# Patient Record
Sex: FEMALE | Race: OTHER | Employment: UNEMPLOYED | URBAN - METROPOLITAN AREA
[De-identification: names, ages, dates, MRNs, and addresses within clinical notes are randomized per-mention and may not be internally consistent; named-entity substitution may affect disease eponyms.]

---

## 2020-08-22 ENCOUNTER — OFFICE VISIT (OUTPATIENT)
Dept: INTERNAL MEDICINE CLINIC | Facility: CLINIC | Age: 25
End: 2020-08-22
Payer: COMMERCIAL

## 2020-08-22 ENCOUNTER — LAB ENCOUNTER (OUTPATIENT)
Dept: LAB | Age: 25
End: 2020-08-22
Attending: PHYSICIAN ASSISTANT
Payer: COMMERCIAL

## 2020-08-22 VITALS
OXYGEN SATURATION: 99 % | HEART RATE: 66 BPM | HEIGHT: 65 IN | SYSTOLIC BLOOD PRESSURE: 100 MMHG | BODY MASS INDEX: 27.82 KG/M2 | RESPIRATION RATE: 16 BRPM | DIASTOLIC BLOOD PRESSURE: 62 MMHG | WEIGHT: 167 LBS | TEMPERATURE: 99 F

## 2020-08-22 DIAGNOSIS — Z11.3 ROUTINE SCREENING FOR STI (SEXUALLY TRANSMITTED INFECTION): ICD-10-CM

## 2020-08-22 DIAGNOSIS — Z01.89 ENCOUNTER FOR ROUTINE LABORATORY TESTING: ICD-10-CM

## 2020-08-22 DIAGNOSIS — N89.8 VAGINAL DISCHARGE: ICD-10-CM

## 2020-08-22 DIAGNOSIS — D50.9 IRON DEFICIENCY ANEMIA, UNSPECIFIED IRON DEFICIENCY ANEMIA TYPE: ICD-10-CM

## 2020-08-22 DIAGNOSIS — Z30.011 ENCOUNTER FOR ORAL CONTRACEPTION INITIAL PRESCRIPTION: ICD-10-CM

## 2020-08-22 DIAGNOSIS — R53.82 CHRONIC FATIGUE: ICD-10-CM

## 2020-08-22 DIAGNOSIS — Z00.00 ROUTINE PHYSICAL EXAMINATION: Primary | ICD-10-CM

## 2020-08-22 DIAGNOSIS — Z12.4 SCREENING FOR MALIGNANT NEOPLASM OF CERVIX: ICD-10-CM

## 2020-08-22 LAB
ALBUMIN SERPL-MCNC: 3.8 G/DL (ref 3.4–5)
ALBUMIN/GLOB SERPL: 1.1 {RATIO} (ref 1–2)
ALP LIVER SERPL-CCNC: 60 U/L (ref 52–144)
ALT SERPL-CCNC: 23 U/L (ref 13–56)
ANION GAP SERPL CALC-SCNC: 4 MMOL/L (ref 0–18)
AST SERPL-CCNC: 16 U/L (ref 15–37)
BASOPHILS # BLD AUTO: 0.04 X10(3) UL (ref 0–0.2)
BASOPHILS NFR BLD AUTO: 0.6 %
BILIRUB SERPL-MCNC: 0.5 MG/DL (ref 0.1–2)
BILIRUB UR QL STRIP.AUTO: NEGATIVE
BUN BLD-MCNC: 11 MG/DL (ref 7–18)
BUN/CREAT SERPL: 14.7 (ref 10–20)
CALCIUM BLD-MCNC: 9 MG/DL (ref 8.5–10.1)
CHLORIDE SERPL-SCNC: 109 MMOL/L (ref 98–112)
CHOLEST SMN-MCNC: 208 MG/DL (ref ?–200)
CO2 SERPL-SCNC: 26 MMOL/L (ref 21–32)
CREAT BLD-MCNC: 0.75 MG/DL (ref 0.55–1.02)
DEPRECATED HBV CORE AB SER IA-ACNC: 19 NG/ML (ref 12–114)
DEPRECATED RDW RBC AUTO: 39.5 FL (ref 35.1–46.3)
EOSINOPHIL # BLD AUTO: 0.07 X10(3) UL (ref 0–0.7)
EOSINOPHIL NFR BLD AUTO: 1 %
ERYTHROCYTE [DISTWIDTH] IN BLOOD BY AUTOMATED COUNT: 11.9 % (ref 11–15)
EST. AVERAGE GLUCOSE BLD GHB EST-MCNC: 100 MG/DL (ref 68–126)
GLOBULIN PLAS-MCNC: 3.5 G/DL (ref 2.8–4.4)
GLUCOSE BLD-MCNC: 76 MG/DL (ref 70–99)
GLUCOSE UR STRIP.AUTO-MCNC: NEGATIVE MG/DL
HBA1C MFR BLD HPLC: 5.1 % (ref ?–5.7)
HCT VFR BLD AUTO: 40.7 % (ref 35–48)
HDLC SERPL-MCNC: 51 MG/DL (ref 40–59)
HGB BLD-MCNC: 13.1 G/DL (ref 12–16)
IMM GRANULOCYTES # BLD AUTO: 0.01 X10(3) UL (ref 0–1)
IMM GRANULOCYTES NFR BLD: 0.1 %
IRON SATURATION: 12 % (ref 15–50)
IRON SERPL-MCNC: 50 UG/DL (ref 50–170)
KETONES UR STRIP.AUTO-MCNC: NEGATIVE MG/DL
LDLC SERPL CALC-MCNC: 133 MG/DL (ref ?–100)
LEUKOCYTE ESTERASE UR QL STRIP.AUTO: NEGATIVE
LYMPHOCYTES # BLD AUTO: 2.42 X10(3) UL (ref 1–4)
LYMPHOCYTES NFR BLD AUTO: 34.9 %
M PROTEIN MFR SERPL ELPH: 7.3 G/DL (ref 6.4–8.2)
MCH RBC QN AUTO: 29.2 PG (ref 26–34)
MCHC RBC AUTO-ENTMCNC: 32.2 G/DL (ref 31–37)
MCV RBC AUTO: 90.6 FL (ref 80–100)
MONOCYTES # BLD AUTO: 0.43 X10(3) UL (ref 0.1–1)
MONOCYTES NFR BLD AUTO: 6.2 %
NEUTROPHILS # BLD AUTO: 3.97 X10 (3) UL (ref 1.5–7.7)
NEUTROPHILS # BLD AUTO: 3.97 X10(3) UL (ref 1.5–7.7)
NEUTROPHILS NFR BLD AUTO: 57.2 %
NITRITE UR QL STRIP.AUTO: NEGATIVE
NONHDLC SERPL-MCNC: 157 MG/DL (ref ?–130)
OSMOLALITY SERPL CALC.SUM OF ELEC: 286 MOSM/KG (ref 275–295)
PATIENT FASTING Y/N/NP: YES
PATIENT FASTING Y/N/NP: YES
PH UR STRIP.AUTO: 5 [PH] (ref 4.5–8)
PLATELET # BLD AUTO: 294 10(3)UL (ref 150–450)
POTASSIUM SERPL-SCNC: 4.1 MMOL/L (ref 3.5–5.1)
PROT UR STRIP.AUTO-MCNC: NEGATIVE MG/DL
RBC # BLD AUTO: 4.49 X10(6)UL (ref 3.8–5.3)
RBC UR QL AUTO: NEGATIVE
SODIUM SERPL-SCNC: 139 MMOL/L (ref 136–145)
SP GR UR STRIP.AUTO: 1.03 (ref 1–1.03)
TOTAL IRON BINDING CAPACITY: 428 UG/DL (ref 240–450)
TRANSFERRIN SERPL-MCNC: 287 MG/DL (ref 200–360)
TRIGL SERPL-MCNC: 121 MG/DL (ref 30–149)
TSI SER-ACNC: 1.32 MIU/ML (ref 0.36–3.74)
UROBILINOGEN UR STRIP.AUTO-MCNC: <2 MG/DL
VIT D+METAB SERPL-MCNC: 19 NG/ML (ref 30–100)
VLDLC SERPL CALC-MCNC: 24 MG/DL (ref 0–30)
WBC # BLD AUTO: 6.9 X10(3) UL (ref 4–11)

## 2020-08-22 PROCEDURE — 80061 LIPID PANEL: CPT

## 2020-08-22 PROCEDURE — 3074F SYST BP LT 130 MM HG: CPT | Performed by: PHYSICIAN ASSISTANT

## 2020-08-22 PROCEDURE — 83036 HEMOGLOBIN GLYCOSYLATED A1C: CPT

## 2020-08-22 PROCEDURE — 87660 TRICHOMONAS VAGIN DIR PROBE: CPT | Performed by: PHYSICIAN ASSISTANT

## 2020-08-22 PROCEDURE — 82728 ASSAY OF FERRITIN: CPT

## 2020-08-22 PROCEDURE — 85025 COMPLETE CBC W/AUTO DIFF WBC: CPT

## 2020-08-22 PROCEDURE — 82306 VITAMIN D 25 HYDROXY: CPT

## 2020-08-22 PROCEDURE — 87591 N.GONORRHOEAE DNA AMP PROB: CPT | Performed by: PHYSICIAN ASSISTANT

## 2020-08-22 PROCEDURE — 80053 COMPREHEN METABOLIC PANEL: CPT

## 2020-08-22 PROCEDURE — 3078F DIAST BP <80 MM HG: CPT | Performed by: PHYSICIAN ASSISTANT

## 2020-08-22 PROCEDURE — 99385 PREV VISIT NEW AGE 18-39: CPT | Performed by: PHYSICIAN ASSISTANT

## 2020-08-22 PROCEDURE — 87480 CANDIDA DNA DIR PROBE: CPT | Performed by: PHYSICIAN ASSISTANT

## 2020-08-22 PROCEDURE — 87510 GARDNER VAG DNA DIR PROBE: CPT | Performed by: PHYSICIAN ASSISTANT

## 2020-08-22 PROCEDURE — 81003 URINALYSIS AUTO W/O SCOPE: CPT

## 2020-08-22 PROCEDURE — 84443 ASSAY THYROID STIM HORMONE: CPT

## 2020-08-22 PROCEDURE — 83540 ASSAY OF IRON: CPT

## 2020-08-22 PROCEDURE — 3008F BODY MASS INDEX DOCD: CPT | Performed by: PHYSICIAN ASSISTANT

## 2020-08-22 PROCEDURE — 88175 CYTOPATH C/V AUTO FLUID REDO: CPT | Performed by: PHYSICIAN ASSISTANT

## 2020-08-22 PROCEDURE — 87491 CHLMYD TRACH DNA AMP PROBE: CPT | Performed by: PHYSICIAN ASSISTANT

## 2020-08-22 PROCEDURE — 83550 IRON BINDING TEST: CPT

## 2020-08-22 PROCEDURE — 36415 COLL VENOUS BLD VENIPUNCTURE: CPT

## 2020-08-22 RX ORDER — NORGESTIMATE AND ETHINYL ESTRADIOL 7DAYSX3 LO
1 KIT ORAL DAILY
Qty: 3 PACKAGE | Refills: 3 | Status: SHIPPED | OUTPATIENT
Start: 2020-08-22 | End: 2021-05-05

## 2020-08-22 NOTE — PROGRESS NOTES
Wellness Exam    CC: Patient is presenting for a wellness exam    HPI:   Concerns: here to establish care. Hx JERAMIE, pt states from dietary deficiency. C/o fatigue recently.  Has tolerated oral iron supplement in the past but has also required iron infusion Neurological: Negative for tremors, weakness and numbness. Hematological: Negative for adenopathy. Does not bruise/bleed easily. Psychiatric/Behavioral: Negative for confusion and agitation. The patient is not nervous/anxious.       Pulse 66   Temp 98 immunizations were discussed with the patient and ordered as follows: Pt will obtain vaccine hx for us  Start ortho tricyclen lo. Discussed medication indications, risks, alternatives and side effects.   Check labs including iron studies and D d/t

## 2020-08-22 NOTE — PATIENT INSTRUCTIONS
Please send us your vaccine record  Have blood drawn today  Start pill on the first day of your next period  Food sources of iron  Iron is found in a few types of foods.  Good sources include:  · Red meat, poultry, fish, eggs  · Dried fruits (especially godfrey

## 2020-08-24 ENCOUNTER — TELEPHONE (OUTPATIENT)
Dept: INTERNAL MEDICINE CLINIC | Facility: CLINIC | Age: 25
End: 2020-08-24

## 2020-08-24 DIAGNOSIS — E55.9 VITAMIN D DEFICIENCY: Primary | ICD-10-CM

## 2020-08-24 LAB
C TRACH DNA SPEC QL NAA+PROBE: NEGATIVE
N GONORRHOEA DNA SPEC QL NAA+PROBE: NEGATIVE

## 2020-08-24 RX ORDER — ERGOCALCIFEROL 1.25 MG/1
50000 CAPSULE ORAL WEEKLY
Qty: 12 CAPSULE | Refills: 0 | Status: SHIPPED | OUTPATIENT
Start: 2020-08-24 | End: 2021-05-05

## 2020-08-24 NOTE — TELEPHONE ENCOUNTER
Spoke with patient's boyfriend (OK Per HIPPA) and discussed results and  recommendations, pharmacy confirmed, and understanding was expressed. Orders Placed.

## 2020-08-24 NOTE — TELEPHONE ENCOUNTER
----- Message from Tiffanie Escoto PA-C sent at 8/24/2020  3:18 PM CDT -----  Vitamin D low- replace with 50,000 International Units weekly x 12 wks then recheck   Otherwise no concerns regarding blood count, no anemia and normal iron stores; normal kid

## 2020-08-26 LAB — LAST PAP RESULT: NORMAL

## 2021-04-20 ENCOUNTER — TELEPHONE (OUTPATIENT)
Dept: OBGYN CLINIC | Facility: CLINIC | Age: 26
End: 2021-04-20

## 2021-04-20 NOTE — TELEPHONE ENCOUNTER
Patient calling to transfer OB care  GA 8 w 6 d  KHOA 11/24/21  Insurance Efrainr  Good time to return phone call Anytime    Pt states she had ultrasound done at [place] in [address] [facility] Appointments   Date Time Provider Department C

## 2021-04-22 ENCOUNTER — TELEPHONE (OUTPATIENT)
Dept: OBGYN CLINIC | Facility: CLINIC | Age: 26
End: 2021-04-22

## 2021-04-22 NOTE — TELEPHONE ENCOUNTER
Patient calling to initiate prenatal care  LMP 2/17/21  Patient is 7-8 weeks on 4/14/21  Confirmation Ultrasound and Appointment scheduled on    Insurance AM BETTER  Good time to return phone call ANYTIME    [address]

## 2021-04-22 NOTE — TELEPHONE ENCOUNTER
Patient calling to initiate prenatal care  LMP 2/16/2021   Patient is 7-8 weeks on 4/13/21  Confirmation Ultrasound and Appointment scheduled on   Future Appointments   Date Time Provider Lily Restrepo   5/5/2021  1:00 PM [facility] OB PFLD US [facility] OB/GYN P EM

## 2021-04-23 NOTE — TELEPHONE ENCOUNTER
Called patient with [facility] ID #[id number]. Left message on answering machine to call back.

## 2021-04-23 NOTE — TELEPHONE ENCOUNTER
Pt called back yesterday and appt scheduled    Future Appointments   Date Time Provider Lily Kaye   5/5/2021  1:00 PM EM[facility] OB PFLD US [facility] OB/GYN P [facility] [address]   5/5/2021  1:45 PM Imam, Alcide Halsted, MD [facility] OB/GYN P EM[facility] [address]

## 2021-04-27 NOTE — TELEPHONE ENCOUNTER
Called patient with  ID #[id number]. New patient;   Taking PNV; no other medications. Pt feeling well; no concerns. Pt advised to keep appt and call for spotting, bleeding, pain or any other concerns. .  Patient verbalized understanding.

## 2021-05-05 ENCOUNTER — TELEPHONE (OUTPATIENT)
Dept: OBGYN CLINIC | Facility: CLINIC | Age: 26
End: 2021-05-05

## 2021-05-05 ENCOUNTER — OFFICE VISIT (OUTPATIENT)
Dept: OBGYN CLINIC | Facility: CLINIC | Age: 26
End: 2021-05-05
Payer: COMMERCIAL

## 2021-05-05 ENCOUNTER — ULTRASOUND ENCOUNTER (OUTPATIENT)
Dept: OBGYN CLINIC | Facility: CLINIC | Age: 26
End: 2021-05-05
Payer: COMMERCIAL

## 2021-05-05 VITALS
SYSTOLIC BLOOD PRESSURE: 120 MMHG | HEIGHT: 64.75 IN | BODY MASS INDEX: 27.83 KG/M2 | DIASTOLIC BLOOD PRESSURE: 80 MMHG | WEIGHT: 165 LBS

## 2021-05-05 DIAGNOSIS — Z32.01 PREGNANCY EXAMINATION OR TEST, POSITIVE RESULT: ICD-10-CM

## 2021-05-05 DIAGNOSIS — N91.1 SECONDARY AMENORRHEA: Primary | ICD-10-CM

## 2021-05-05 PROCEDURE — 3008F BODY MASS INDEX DOCD: CPT | Performed by: OBSTETRICS & GYNECOLOGY

## 2021-05-05 PROCEDURE — 3079F DIAST BP 80-89 MM HG: CPT | Performed by: OBSTETRICS & GYNECOLOGY

## 2021-05-05 PROCEDURE — 99203 OFFICE O/P NEW LOW 30 MIN: CPT | Performed by: OBSTETRICS & GYNECOLOGY

## 2021-05-05 PROCEDURE — 3074F SYST BP LT 130 MM HG: CPT | Performed by: OBSTETRICS & GYNECOLOGY

## 2021-05-05 PROCEDURE — 76856 US EXAM PELVIC COMPLETE: CPT | Performed by: OBSTETRICS & GYNECOLOGY

## 2021-05-05 RX ORDER — PRENATAL VIT/IRON FUM/FOLIC AC 27MG-0.8MG
1 TABLET ORAL DAILY
COMMUNITY

## 2021-05-05 NOTE — TELEPHONE ENCOUNTER
Patient was seen in office today and was to call us back if she would like genetic testing. She has decided that she would like to go ahead and have the testing done.  Please call her when for is complete

## 2021-05-05 NOTE — PROGRESS NOTES
[facility]  Obstetrics and Gynecology    Subjective:     Belem Ramirez is a 27year old  female presents with c/o secondary amenorrhea and positive pregnancy test. The patient was recommended to return for further evaluation and a 05/05/21, 2:40 PM

## 2021-05-10 NOTE — TELEPHONE ENCOUNTER
Pt would like [id number] form to  at her next appt in [facility]. Routed to [facility].

## 2021-05-25 ENCOUNTER — INITIAL PRENATAL (OUTPATIENT)
Dept: OBGYN CLINIC | Facility: CLINIC | Age: 26
End: 2021-05-25
Payer: COMMERCIAL

## 2021-05-25 VITALS
DIASTOLIC BLOOD PRESSURE: 80 MMHG | BODY MASS INDEX: 27.83 KG/M2 | SYSTOLIC BLOOD PRESSURE: 136 MMHG | WEIGHT: 165 LBS | HEIGHT: 64.75 IN

## 2021-05-25 DIAGNOSIS — E55.9 VITAMIN D DEFICIENCY: ICD-10-CM

## 2021-05-25 DIAGNOSIS — Z34.90 PRENATAL CARE, ANTEPARTUM: Primary | ICD-10-CM

## 2021-05-25 DIAGNOSIS — Z11.3 SCREEN FOR STD (SEXUALLY TRANSMITTED DISEASE): ICD-10-CM

## 2021-05-25 DIAGNOSIS — Z36.8A ENCOUNTER FOR ANTENATAL SCREENING FOR OTHER GENETIC DEFECT: ICD-10-CM

## 2021-05-25 PROCEDURE — 3075F SYST BP GE 130 - 139MM HG: CPT | Performed by: NURSE PRACTITIONER

## 2021-05-25 PROCEDURE — 87086 URINE CULTURE/COLONY COUNT: CPT | Performed by: NURSE PRACTITIONER

## 2021-05-25 PROCEDURE — 3008F BODY MASS INDEX DOCD: CPT | Performed by: NURSE PRACTITIONER

## 2021-05-25 PROCEDURE — 87591 N.GONORRHOEAE DNA AMP PROB: CPT | Performed by: NURSE PRACTITIONER

## 2021-05-25 PROCEDURE — 81002 URINALYSIS NONAUTO W/O SCOPE: CPT | Performed by: NURSE PRACTITIONER

## 2021-05-25 PROCEDURE — 87491 CHLMYD TRACH DNA AMP PROBE: CPT | Performed by: NURSE PRACTITIONER

## 2021-05-25 PROCEDURE — 3079F DIAST BP 80-89 MM HG: CPT | Performed by: NURSE PRACTITIONER

## 2021-05-25 NOTE — PROGRESS NOTES
Here for initial prenatal visit with our group. 27year old  at 14w0d by LMP. She is accompanied by her spouse today who is translating for most of the visit. She does understand some English as well.     Patient's last menstrual period was 20

## 2021-06-04 ENCOUNTER — LAB ENCOUNTER (OUTPATIENT)
Dept: LAB | Age: 26
End: 2021-06-04
Attending: NURSE PRACTITIONER
Payer: COMMERCIAL

## 2021-06-04 DIAGNOSIS — E55.9 VITAMIN D DEFICIENCY: ICD-10-CM

## 2021-06-04 DIAGNOSIS — Z36.8A ENCOUNTER FOR ANTENATAL SCREENING FOR OTHER GENETIC DEFECT: Primary | ICD-10-CM

## 2021-06-04 DIAGNOSIS — Z34.90 PRENATAL CARE, ANTEPARTUM: ICD-10-CM

## 2021-06-04 PROCEDURE — 87389 HIV-1 AG W/HIV-1&-2 AB AG IA: CPT | Performed by: NURSE PRACTITIONER

## 2021-06-04 PROCEDURE — 82306 VITAMIN D 25 HYDROXY: CPT | Performed by: NURSE PRACTITIONER

## 2021-06-04 PROCEDURE — 85025 COMPLETE CBC W/AUTO DIFF WBC: CPT | Performed by: NURSE PRACTITIONER

## 2021-06-04 PROCEDURE — 87340 HEPATITIS B SURFACE AG IA: CPT | Performed by: NURSE PRACTITIONER

## 2021-06-04 PROCEDURE — 86762 RUBELLA ANTIBODY: CPT | Performed by: NURSE PRACTITIONER

## 2021-06-04 PROCEDURE — 86780 TREPONEMA PALLIDUM: CPT | Performed by: NURSE PRACTITIONER

## 2021-06-04 PROCEDURE — 86850 RBC ANTIBODY SCREEN: CPT | Performed by: NURSE PRACTITIONER

## 2021-06-04 PROCEDURE — 86900 BLOOD TYPING SEROLOGIC ABO: CPT | Performed by: NURSE PRACTITIONER

## 2021-06-04 PROCEDURE — 86901 BLOOD TYPING SEROLOGIC RH(D): CPT | Performed by: NURSE PRACTITIONER

## 2021-06-07 ENCOUNTER — TELEPHONE (OUTPATIENT)
Dept: INTERNAL MEDICINE CLINIC | Facility: CLINIC | Age: 26
End: 2021-06-07

## 2021-06-07 DIAGNOSIS — E55.9 VITAMIN D DEFICIENCY: Primary | ICD-10-CM

## 2021-06-07 RX ORDER — ERGOCALCIFEROL 1.25 MG/1
50000 CAPSULE ORAL WEEKLY
Qty: 12 CAPSULE | Refills: 0 | Status: SHIPPED | OUTPATIENT
Start: 2021-06-07 | End: 2021-10-05

## 2021-06-07 NOTE — TELEPHONE ENCOUNTER
LMOVM to [address] with patient's Spouse (OK Per HIPAA) and discussed results and  recommendations, pharmacy confirmed, and understanding was expressed. Orders placed.

## 2021-06-07 NOTE — TELEPHONE ENCOUNTER
----- Message from Lor Townsend MD sent at 6/5/2021  1:35 PM CDT -----  Replace vit d 50,000 IU weekly x 3 months, then recheck vit d

## 2021-06-09 ENCOUNTER — TELEPHONE (OUTPATIENT)
Dept: OBGYN CLINIC | Facility: CLINIC | Age: 26
End: 2021-06-09

## 2021-06-09 NOTE — TELEPHONE ENCOUNTER
Patient notified of normal results and female fetus. Pt requesting copy of report to  in [facility] tomorrow. Faxed to [facility].

## 2021-06-21 ENCOUNTER — ROUTINE PRENATAL (OUTPATIENT)
Dept: OBGYN CLINIC | Facility: CLINIC | Age: 26
End: 2021-06-21
Payer: COMMERCIAL

## 2021-06-21 VITALS
HEIGHT: 64.75 IN | DIASTOLIC BLOOD PRESSURE: 60 MMHG | SYSTOLIC BLOOD PRESSURE: 110 MMHG | BODY MASS INDEX: 28.44 KG/M2 | WEIGHT: 168.63 LBS

## 2021-06-21 DIAGNOSIS — Z34.02 ENCOUNTER FOR SUPERVISION OF NORMAL FIRST PREGNANCY IN SECOND TRIMESTER: Primary | ICD-10-CM

## 2021-06-21 PROCEDURE — 81002 URINALYSIS NONAUTO W/O SCOPE: CPT | Performed by: OBSTETRICS & GYNECOLOGY

## 2021-06-21 PROCEDURE — 3078F DIAST BP <80 MM HG: CPT | Performed by: OBSTETRICS & GYNECOLOGY

## 2021-06-21 PROCEDURE — 3008F BODY MASS INDEX DOCD: CPT | Performed by: OBSTETRICS & GYNECOLOGY

## 2021-06-21 PROCEDURE — 3074F SYST BP LT 130 MM HG: CPT | Performed by: OBSTETRICS & GYNECOLOGY

## 2021-06-21 NOTE — PROGRESS NOTES
Patient presents with:  Prenatal Care: LUCY  Translation through partner  Routine prenatal visit without complaints. Patient denies any bleeding, leaking fluid, cramping, or contractions. Good fetal movement. Assessment/Plan:  17w6d doing well  Had MT21.

## 2021-06-25 ENCOUNTER — TELEPHONE (OUTPATIENT)
Dept: OBGYN CLINIC | Facility: CLINIC | Age: 26
End: 2021-06-25

## 2021-06-25 NOTE — TELEPHONE ENCOUNTER
Contacted patient with  ID # [id number]    G1/P 0 GA 18 3/7 wks patient complaining of mild cramping that started yesterday. Feels like menstrual cramps. No severe pain.  Isn't able to say how much water she is drinking but says she is drinking all t

## 2021-07-09 ENCOUNTER — ROUTINE PRENATAL (OUTPATIENT)
Dept: OBGYN CLINIC | Facility: CLINIC | Age: 26
End: 2021-07-09
Payer: COMMERCIAL

## 2021-07-09 ENCOUNTER — ULTRASOUND ENCOUNTER (OUTPATIENT)
Dept: OBGYN CLINIC | Facility: CLINIC | Age: 26
End: 2021-07-09
Payer: COMMERCIAL

## 2021-07-09 VITALS — BODY MASS INDEX: 29 KG/M2 | WEIGHT: 174.19 LBS | DIASTOLIC BLOOD PRESSURE: 62 MMHG | SYSTOLIC BLOOD PRESSURE: 102 MMHG

## 2021-07-09 DIAGNOSIS — Z34.02 ENCOUNTER FOR SUPERVISION OF NORMAL FIRST PREGNANCY IN SECOND TRIMESTER: Primary | ICD-10-CM

## 2021-07-09 LAB
GLUCOSE (URINE DIPSTICK): NEGATIVE MG/DL
MULTISTIX LOT#: NORMAL NUMERIC

## 2021-07-09 PROCEDURE — 3074F SYST BP LT 130 MM HG: CPT | Performed by: OBSTETRICS & GYNECOLOGY

## 2021-07-09 PROCEDURE — 76805 OB US >/= 14 WKS SNGL FETUS: CPT | Performed by: OBSTETRICS & GYNECOLOGY

## 2021-07-09 PROCEDURE — 3078F DIAST BP <80 MM HG: CPT | Performed by: OBSTETRICS & GYNECOLOGY

## 2021-07-09 PROCEDURE — 81002 URINALYSIS NONAUTO W/O SCOPE: CPT | Performed by: OBSTETRICS & GYNECOLOGY

## 2021-07-09 NOTE — PROGRESS NOTES
20w3d seen for routine OB visit. Denies ctx, lof, vb. Reports good FM.     Patient Active Problem List:     Encounter for supervision of normal first pregnancy in second trimester       Gen: AAOx3, NAD  Resp: breathing comfortably  Abdomen: gravid, soft,

## 2021-07-09 NOTE — PATIENT INSTRUCTIONS
Pregnancy: Your Second Trimester Changes  Each day, you and your baby are changing and growing together. Here’s a quick look at what’s happening to both of you.   How you are changing  Even when you don’t notice it, your body is adapting to meet the needs

## 2021-08-04 ENCOUNTER — ROUTINE PRENATAL (OUTPATIENT)
Dept: OBGYN CLINIC | Facility: CLINIC | Age: 26
End: 2021-08-04
Payer: COMMERCIAL

## 2021-08-04 VITALS — WEIGHT: 178.63 LBS | DIASTOLIC BLOOD PRESSURE: 70 MMHG | BODY MASS INDEX: 30 KG/M2 | SYSTOLIC BLOOD PRESSURE: 102 MMHG

## 2021-08-04 DIAGNOSIS — Z34.02 ENCOUNTER FOR SUPERVISION OF NORMAL FIRST PREGNANCY IN SECOND TRIMESTER: Primary | ICD-10-CM

## 2021-08-04 PROCEDURE — 87086 URINE CULTURE/COLONY COUNT: CPT | Performed by: NURSE PRACTITIONER

## 2021-08-04 PROCEDURE — 3074F SYST BP LT 130 MM HG: CPT | Performed by: NURSE PRACTITIONER

## 2021-08-04 PROCEDURE — 3078F DIAST BP <80 MM HG: CPT | Performed by: NURSE PRACTITIONER

## 2021-08-04 NOTE — PROGRESS NOTES
LUCY  Doing well- some sharp left lower quadrant pain at times. Advised on maternity belt. She is to call if it is persistent or worsens in intensity, or any nausea/ vomiting or fever associated.    FM is good  RH positive  1 hr glucose/ CBC ordered  TDAP re

## 2021-08-09 ENCOUNTER — TELEPHONE (OUTPATIENT)
Dept: OBGYN CLINIC | Facility: CLINIC | Age: 26
End: 2021-08-09

## 2021-08-09 ENCOUNTER — ROUTINE PRENATAL (OUTPATIENT)
Dept: OBGYN CLINIC | Facility: CLINIC | Age: 26
End: 2021-08-09
Payer: COMMERCIAL

## 2021-08-09 VITALS
DIASTOLIC BLOOD PRESSURE: 62 MMHG | BODY MASS INDEX: 30 KG/M2 | HEART RATE: 89 BPM | SYSTOLIC BLOOD PRESSURE: 108 MMHG | WEIGHT: 179.19 LBS

## 2021-08-09 DIAGNOSIS — O36.8190 DECREASED FETAL MOVEMENT AFFECTING MANAGEMENT OF PREGNANCY, ANTEPARTUM, SINGLE OR UNSPECIFIED FETUS: Primary | ICD-10-CM

## 2021-08-09 DIAGNOSIS — Z34.90 PRENATAL CARE, ANTEPARTUM: ICD-10-CM

## 2021-08-09 LAB
GLUCOSE (URINE DIPSTICK): NEGATIVE MG/DL
MULTISTIX LOT#: ABNORMAL NUMERIC
PROTEIN (URINE DIPSTICK): 30 MG/DL

## 2021-08-09 PROCEDURE — 3078F DIAST BP <80 MM HG: CPT | Performed by: OBSTETRICS & GYNECOLOGY

## 2021-08-09 PROCEDURE — 81002 URINALYSIS NONAUTO W/O SCOPE: CPT | Performed by: OBSTETRICS & GYNECOLOGY

## 2021-08-09 PROCEDURE — 3074F SYST BP LT 130 MM HG: CPT | Performed by: OBSTETRICS & GYNECOLOGY

## 2021-08-09 NOTE — TELEPHONE ENCOUNTER
24W6D called c/o decrease in fetal movement. She states she has been having movement daily. She denies any vaginal bleeding or pain. Denies any other s/s. She has tried to eat and drink. She has not felt movement since last night.   Last OV:

## 2021-08-09 NOTE — PROGRESS NOTES
24w6d seen for decreased fetal movement. She has not noticed baby move in 2 days. Denies ctx, lof, vb.     Patient Active Problem List:     Encounter for supervision of normal first pregnancy in second trimester       Gen: AAOx3, NAD  Resp: breathing comfo

## 2021-08-10 ENCOUNTER — HOSPITAL ENCOUNTER (OUTPATIENT)
Age: 26
Discharge: HOME | End: 2021-08-10
Payer: COMMERCIAL

## 2021-08-10 ENCOUNTER — LAB ENCOUNTER (OUTPATIENT)
Dept: LAB | Age: 26
End: 2021-08-10
Attending: NURSE PRACTITIONER
Payer: COMMERCIAL

## 2021-08-10 ENCOUNTER — MED REC SCAN ONLY (OUTPATIENT)
Dept: OBGYN CLINIC | Facility: CLINIC | Age: 26
End: 2021-08-10

## 2021-08-10 VITALS
OXYGEN SATURATION: 99 % | SYSTOLIC BLOOD PRESSURE: 116 MMHG | DIASTOLIC BLOOD PRESSURE: 80 MMHG | TEMPERATURE: 97 F | RESPIRATION RATE: 20 BRPM | HEART RATE: 79 BPM

## 2021-08-10 DIAGNOSIS — R09.81 HEAD CONGESTION: Primary | ICD-10-CM

## 2021-08-10 DIAGNOSIS — Z34.02 ENCOUNTER FOR SUPERVISION OF NORMAL FIRST PREGNANCY IN SECOND TRIMESTER: ICD-10-CM

## 2021-08-10 DIAGNOSIS — Z20.822 LAB TEST NEGATIVE FOR COVID-19 VIRUS: ICD-10-CM

## 2021-08-10 LAB
BASOPHILS # BLD AUTO: 0.05 X10(3) UL (ref 0–0.2)
BASOPHILS NFR BLD AUTO: 0.4 %
EOSINOPHIL # BLD AUTO: 0.18 X10(3) UL (ref 0–0.7)
EOSINOPHIL NFR BLD AUTO: 1.6 %
ERYTHROCYTE [DISTWIDTH] IN BLOOD BY AUTOMATED COUNT: 12.6 %
GLUCOSE 1H P GLC SERPL-MCNC: 54 MG/DL
HCT VFR BLD AUTO: 37 %
HGB BLD-MCNC: 12.5 G/DL
IMM GRANULOCYTES # BLD AUTO: 0.03 X10(3) UL (ref 0–1)
IMM GRANULOCYTES NFR BLD: 0.3 %
LYMPHOCYTES # BLD AUTO: 2.08 X10(3) UL (ref 1–4)
LYMPHOCYTES NFR BLD AUTO: 18.7 %
MCH RBC QN AUTO: 31.5 PG (ref 26–34)
MCHC RBC AUTO-ENTMCNC: 33.8 G/DL (ref 31–37)
MCV RBC AUTO: 93.2 FL
MONOCYTES # BLD AUTO: 0.74 X10(3) UL (ref 0.1–1)
MONOCYTES NFR BLD AUTO: 6.6 %
NEUTROPHILS # BLD AUTO: 8.06 X10 (3) UL (ref 1.5–7.7)
NEUTROPHILS # BLD AUTO: 8.06 X10(3) UL (ref 1.5–7.7)
NEUTROPHILS NFR BLD AUTO: 72.4 %
PLATELET # BLD AUTO: 267 10(3)UL (ref 150–450)
RBC # BLD AUTO: 3.97 X10(6)UL
SARS-COV-2 RNA RESP QL NAA+PROBE: NOT DETECTED
WBC # BLD AUTO: 11.1 X10(3) UL (ref 4–11)

## 2021-08-10 PROCEDURE — 82950 GLUCOSE TEST: CPT | Performed by: NURSE PRACTITIONER

## 2021-08-10 PROCEDURE — 99213 OFFICE O/P EST LOW 20 MIN: CPT | Performed by: PHYSICIAN ASSISTANT

## 2021-08-10 PROCEDURE — 85025 COMPLETE CBC W/AUTO DIFF WBC: CPT | Performed by: NURSE PRACTITIONER

## 2021-08-10 PROCEDURE — U0002 COVID-19 LAB TEST NON-CDC: HCPCS | Performed by: PHYSICIAN ASSISTANT

## 2021-09-01 ENCOUNTER — ROUTINE PRENATAL (OUTPATIENT)
Dept: OBGYN CLINIC | Facility: CLINIC | Age: 26
End: 2021-09-01
Payer: COMMERCIAL

## 2021-09-01 ENCOUNTER — LAB ENCOUNTER (OUTPATIENT)
Dept: LAB | Age: 26
End: 2021-09-01
Attending: OBSTETRICS & GYNECOLOGY
Payer: COMMERCIAL

## 2021-09-01 VITALS
HEIGHT: 64.75 IN | SYSTOLIC BLOOD PRESSURE: 110 MMHG | WEIGHT: 182.19 LBS | BODY MASS INDEX: 30.73 KG/M2 | DIASTOLIC BLOOD PRESSURE: 60 MMHG

## 2021-09-01 DIAGNOSIS — Z23 NEED FOR VACCINATION: ICD-10-CM

## 2021-09-01 DIAGNOSIS — Z34.00 SUPERVISION OF NORMAL FIRST PREGNANCY, ANTEPARTUM: ICD-10-CM

## 2021-09-01 DIAGNOSIS — Z3A.28 28 WEEKS GESTATION OF PREGNANCY: Primary | ICD-10-CM

## 2021-09-01 LAB
GLUCOSE (URINE DIPSTICK): NEGATIVE MG/DL
MULTISTIX LOT#: NORMAL NUMERIC
PROTEIN (URINE DIPSTICK): NEGATIVE MG/DL

## 2021-09-01 PROCEDURE — 3074F SYST BP LT 130 MM HG: CPT | Performed by: OBSTETRICS & GYNECOLOGY

## 2021-09-01 PROCEDURE — 3078F DIAST BP <80 MM HG: CPT | Performed by: OBSTETRICS & GYNECOLOGY

## 2021-09-01 PROCEDURE — 81002 URINALYSIS NONAUTO W/O SCOPE: CPT | Performed by: OBSTETRICS & GYNECOLOGY

## 2021-09-01 PROCEDURE — 90715 TDAP VACCINE 7 YRS/> IM: CPT | Performed by: OBSTETRICS & GYNECOLOGY

## 2021-09-01 PROCEDURE — 3008F BODY MASS INDEX DOCD: CPT | Performed by: OBSTETRICS & GYNECOLOGY

## 2021-09-01 PROCEDURE — 90471 IMMUNIZATION ADMIN: CPT | Performed by: OBSTETRICS & GYNECOLOGY

## 2021-09-01 PROCEDURE — 87389 HIV-1 AG W/HIV-1&-2 AB AG IA: CPT | Performed by: OBSTETRICS & GYNECOLOGY

## 2021-09-01 NOTE — PROGRESS NOTES
LUCY    GA: 28w1d   21  1013   BP: 110/60   Weight: 182 lb 3.2 oz (82.6 kg)   Height: 64.75\"       Doing well, +FM  Denies LOF/VB/uctx. However, reports persistent clear discharge for past 3 days. SSE: negative pooling, negative ferning.  Nit

## 2021-09-08 ENCOUNTER — TELEPHONE (OUTPATIENT)
Dept: OBGYN CLINIC | Facility: CLINIC | Age: 26
End: 2021-09-08

## 2021-09-08 NOTE — TELEPHONE ENCOUNTER
Patient states that she was bleeding some on Sunday. This am she had more reddish bleeding. She does not have any cramping.  Please call to advise

## 2021-09-08 NOTE — TELEPHONE ENCOUNTER
29w1d; ; last seen 21. Called patient with [address] #[id number]. Pt reports vomiting blood on ; brown; one episode. Pt reports vomiting blood again this morning; dark red to black color; large amount mixed with water.   P

## 2021-09-17 ENCOUNTER — ROUTINE PRENATAL (OUTPATIENT)
Dept: OBGYN CLINIC | Facility: CLINIC | Age: 26
End: 2021-09-17
Payer: COMMERCIAL

## 2021-09-17 ENCOUNTER — LAB ENCOUNTER (OUTPATIENT)
Dept: LAB | Age: 26
End: 2021-09-17
Attending: NURSE PRACTITIONER
Payer: COMMERCIAL

## 2021-09-17 VITALS — BODY MASS INDEX: 31 KG/M2 | WEIGHT: 185.63 LBS | SYSTOLIC BLOOD PRESSURE: 114 MMHG | DIASTOLIC BLOOD PRESSURE: 64 MMHG

## 2021-09-17 DIAGNOSIS — E55.9 VITAMIN D DEFICIENCY: ICD-10-CM

## 2021-09-17 DIAGNOSIS — Z34.90 PRENATAL CARE, ANTEPARTUM: Primary | ICD-10-CM

## 2021-09-17 LAB
GLUCOSE (URINE DIPSTICK): NEGATIVE MG/DL
MULTISTIX LOT#: ABNORMAL NUMERIC
PROTEIN (URINE DIPSTICK): 30 MG/DL
VIT D+METAB SERPL-MCNC: 31 NG/ML (ref 30–100)

## 2021-09-17 PROCEDURE — 3074F SYST BP LT 130 MM HG: CPT | Performed by: NURSE PRACTITIONER

## 2021-09-17 PROCEDURE — 3078F DIAST BP <80 MM HG: CPT | Performed by: NURSE PRACTITIONER

## 2021-09-17 PROCEDURE — 81002 URINALYSIS NONAUTO W/O SCOPE: CPT | Performed by: NURSE PRACTITIONER

## 2021-09-17 PROCEDURE — 82306 VITAMIN D 25 HYDROXY: CPT | Performed by: NURSE PRACTITIONER

## 2021-09-17 NOTE — PATIENT INSTRUCTIONS
Conteo de patadas  Es normal que le preocupe la rivera de mata bebé. Para saber si el bebé está ml, alley de las cosas que puede hacer es anotar los movimientos del bebé alley vez al día.  A esto se le llama conteo de patadas.    Recuerde llevar teresa anotacione

## 2021-09-17 NOTE — PROGRESS NOTES
LUCY  Doing well, no concerns or questions  GOOD FM  Denies VB/LOF/uctx  Vitamin D ordered per patient request  RTC in 2 wks  Fetal movement instructions given

## 2021-09-20 ENCOUNTER — INITIAL PRENATAL (OUTPATIENT)
Dept: OBGYN CLINIC | Facility: CLINIC | Age: 26
End: 2021-09-20
Payer: COMMERCIAL

## 2021-09-20 VITALS
BODY MASS INDEX: 31.6 KG/M2 | HEART RATE: 87 BPM | DIASTOLIC BLOOD PRESSURE: 66 MMHG | SYSTOLIC BLOOD PRESSURE: 112 MMHG | HEIGHT: 64.75 IN | WEIGHT: 187.38 LBS

## 2021-09-20 DIAGNOSIS — Z34.83 PRENATAL CARE, SUBSEQUENT PREGNANCY, THIRD TRIMESTER: Primary | ICD-10-CM

## 2021-09-20 DIAGNOSIS — Z23 NEED FOR VACCINATION: ICD-10-CM

## 2021-09-20 PROCEDURE — 3074F SYST BP LT 130 MM HG: CPT | Performed by: STUDENT IN AN ORGANIZED HEALTH CARE EDUCATION/TRAINING PROGRAM

## 2021-09-20 PROCEDURE — 3008F BODY MASS INDEX DOCD: CPT | Performed by: STUDENT IN AN ORGANIZED HEALTH CARE EDUCATION/TRAINING PROGRAM

## 2021-09-20 PROCEDURE — 3078F DIAST BP <80 MM HG: CPT | Performed by: STUDENT IN AN ORGANIZED HEALTH CARE EDUCATION/TRAINING PROGRAM

## 2021-09-20 PROCEDURE — 81002 URINALYSIS NONAUTO W/O SCOPE: CPT | Performed by: STUDENT IN AN ORGANIZED HEALTH CARE EDUCATION/TRAINING PROGRAM

## 2021-09-20 PROCEDURE — 90471 IMMUNIZATION ADMIN: CPT | Performed by: STUDENT IN AN ORGANIZED HEALTH CARE EDUCATION/TRAINING PROGRAM

## 2021-09-20 PROCEDURE — 90686 IIV4 VACC NO PRSV 0.5 ML IM: CPT | Performed by: STUDENT IN AN ORGANIZED HEALTH CARE EDUCATION/TRAINING PROGRAM

## 2021-09-20 NOTE — PROGRESS NOTES
Transfer OB - 30w6d   Doing well overall, baby active  Uncomplicated pregnancy thusfar, uncomplicated PMHx.  Only taking PNV, stopped Vit D (& last level normal range)    27year old , EDC by LMP c/w 11wk US, transferred care at 30w6d  -last pap

## 2021-10-05 ENCOUNTER — TELEPHONE (OUTPATIENT)
Dept: OBGYN CLINIC | Facility: CLINIC | Age: 26
End: 2021-10-05

## 2021-10-05 ENCOUNTER — ROUTINE PRENATAL (OUTPATIENT)
Dept: OBGYN CLINIC | Facility: CLINIC | Age: 26
End: 2021-10-05
Payer: COMMERCIAL

## 2021-10-05 VITALS
HEART RATE: 89 BPM | SYSTOLIC BLOOD PRESSURE: 110 MMHG | HEIGHT: 64.75 IN | DIASTOLIC BLOOD PRESSURE: 62 MMHG | BODY MASS INDEX: 31.54 KG/M2 | WEIGHT: 187 LBS

## 2021-10-05 DIAGNOSIS — E66.3 OVERWEIGHT (BMI 25.0-29.9): ICD-10-CM

## 2021-10-05 DIAGNOSIS — O9A.213 TRAUMATIC INJURY DURING PREGNANCY IN THIRD TRIMESTER: Primary | ICD-10-CM

## 2021-10-05 DIAGNOSIS — Z34.03 PREGNANCY, FIRST, THIRD TRIMESTER: ICD-10-CM

## 2021-10-05 DIAGNOSIS — Z86.39 HISTORY OF VITAMIN D DEFICIENCY: ICD-10-CM

## 2021-10-05 PROCEDURE — 3008F BODY MASS INDEX DOCD: CPT | Performed by: OBSTETRICS & GYNECOLOGY

## 2021-10-05 PROCEDURE — 3078F DIAST BP <80 MM HG: CPT | Performed by: OBSTETRICS & GYNECOLOGY

## 2021-10-05 PROCEDURE — 3074F SYST BP LT 130 MM HG: CPT | Performed by: OBSTETRICS & GYNECOLOGY

## 2021-10-05 PROCEDURE — 59025 FETAL NON-STRESS TEST: CPT | Performed by: OBSTETRICS & GYNECOLOGY

## 2021-10-05 PROCEDURE — 81002 URINALYSIS NONAUTO W/O SCOPE: CPT | Performed by: OBSTETRICS & GYNECOLOGY
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
## 2021-10-05 NOTE — PATIENT INSTRUCTIONS
Weight gain goal 15-25 lb for the entire pregnancy. Limit further weight gain. Make sure you are taking extra vitamin D in addition to the prenatal vitamin.  Your history of deficiency indicates you generally do not have enough without extra supplemen

## 2021-10-21 ENCOUNTER — ROUTINE PRENATAL (OUTPATIENT)
Dept: OBGYN CLINIC | Facility: CLINIC | Age: 26
End: 2021-10-21
Payer: COMMERCIAL

## 2021-10-21 VITALS
DIASTOLIC BLOOD PRESSURE: 58 MMHG | BODY MASS INDEX: 32.04 KG/M2 | HEART RATE: 95 BPM | HEIGHT: 64.75 IN | SYSTOLIC BLOOD PRESSURE: 110 MMHG | WEIGHT: 190 LBS

## 2021-10-21 DIAGNOSIS — Z34.83 PRENATAL CARE, SUBSEQUENT PREGNANCY, THIRD TRIMESTER: Primary | ICD-10-CM

## 2021-10-21 DIAGNOSIS — Z3A.35 35 WEEKS GESTATION OF PREGNANCY: ICD-10-CM

## 2021-10-21 PROCEDURE — 3074F SYST BP LT 130 MM HG: CPT | Performed by: OBSTETRICS & GYNECOLOGY

## 2021-10-21 PROCEDURE — 3008F BODY MASS INDEX DOCD: CPT | Performed by: OBSTETRICS & GYNECOLOGY

## 2021-10-21 PROCEDURE — 3078F DIAST BP <80 MM HG: CPT | Performed by: OBSTETRICS & GYNECOLOGY

## 2021-10-21 PROCEDURE — 81002 URINALYSIS NONAUTO W/O SCOPE: CPT | Performed by: OBSTETRICS & GYNECOLOGY

## 2021-10-21 NOTE — PROGRESS NOTES
Transfer OB - 30w6d   Doing well overall, baby active  Uncomplicated pregnancy thusfar, uncomplicated PMHx.  Only taking PNV, stopped Vit D (& last level normal range)  Patient has no complaints  -transferred care at 30w6d  -last pap 8/2020 NILM  -NIPT norm

## 2021-10-26 ENCOUNTER — ROUTINE PRENATAL (OUTPATIENT)
Dept: OBGYN CLINIC | Facility: CLINIC | Age: 26
End: 2021-10-26
Payer: COMMERCIAL

## 2021-10-26 VITALS
BODY MASS INDEX: 32.04 KG/M2 | DIASTOLIC BLOOD PRESSURE: 70 MMHG | SYSTOLIC BLOOD PRESSURE: 120 MMHG | WEIGHT: 190 LBS | HEIGHT: 64.75 IN

## 2021-10-26 DIAGNOSIS — Z3A.36 36 WEEKS GESTATION OF PREGNANCY: ICD-10-CM

## 2021-10-26 DIAGNOSIS — Z34.03 ENCOUNTER FOR SUPERVISION OF NORMAL FIRST PREGNANCY IN THIRD TRIMESTER: Primary | ICD-10-CM

## 2021-10-26 PROCEDURE — 81002 URINALYSIS NONAUTO W/O SCOPE: CPT | Performed by: OBSTETRICS & GYNECOLOGY

## 2021-10-26 PROCEDURE — 3074F SYST BP LT 130 MM HG: CPT | Performed by: OBSTETRICS & GYNECOLOGY

## 2021-10-26 PROCEDURE — 87081 CULTURE SCREEN ONLY: CPT | Performed by: OBSTETRICS & GYNECOLOGY

## 2021-10-26 PROCEDURE — 3078F DIAST BP <80 MM HG: CPT | Performed by: OBSTETRICS & GYNECOLOGY

## 2021-10-26 PROCEDURE — 87653 STREP B DNA AMP PROBE: CPT | Performed by: OBSTETRICS & GYNECOLOGY

## 2021-10-26 PROCEDURE — 3008F BODY MASS INDEX DOCD: CPT | Performed by: OBSTETRICS & GYNECOLOGY

## 2021-10-26 NOTE — PROGRESS NOTES
Patient has no complaints  - GBS done  -transferred care at 30w6d  -last pap 8/2020 NILM  -NIPT normal, GIRL  -normal anatomy US  -1h GTT normal, Hgb 12.5, 3rd tri HIV neg  -Tdap, flu vaccine and HIV done

## 2021-11-02 ENCOUNTER — ROUTINE PRENATAL (OUTPATIENT)
Dept: OBGYN CLINIC | Facility: CLINIC | Age: 26
End: 2021-11-02
Payer: COMMERCIAL

## 2021-11-02 VITALS
DIASTOLIC BLOOD PRESSURE: 68 MMHG | WEIGHT: 191 LBS | HEART RATE: 94 BPM | HEIGHT: 64.75 IN | BODY MASS INDEX: 32.21 KG/M2 | SYSTOLIC BLOOD PRESSURE: 118 MMHG

## 2021-11-02 DIAGNOSIS — Z34.93 ENCOUNTER FOR SUPERVISION OF NORMAL PREGNANCY IN THIRD TRIMESTER, UNSPECIFIED GRAVIDITY: Primary | ICD-10-CM

## 2021-11-02 PROCEDURE — 3074F SYST BP LT 130 MM HG: CPT | Performed by: STUDENT IN AN ORGANIZED HEALTH CARE EDUCATION/TRAINING PROGRAM

## 2021-11-02 PROCEDURE — 3008F BODY MASS INDEX DOCD: CPT | Performed by: STUDENT IN AN ORGANIZED HEALTH CARE EDUCATION/TRAINING PROGRAM

## 2021-11-02 PROCEDURE — 3078F DIAST BP <80 MM HG: CPT | Performed by: STUDENT IN AN ORGANIZED HEALTH CARE EDUCATION/TRAINING PROGRAM

## 2021-11-02 PROCEDURE — 81002 URINALYSIS NONAUTO W/O SCOPE: CPT | Performed by: STUDENT IN AN ORGANIZED HEALTH CARE EDUCATION/TRAINING PROGRAM

## 2021-11-02 NOTE — PROGRESS NOTES
Doing well overall. Felt more discharge coming out a few times Sunday.  Was white but wondering if abnormal  Spec exam negative for ROM, cervix closed    -transferred care at 30w6d  -breast pump ordered, looking for peds but in process of changing insurance

## 2021-11-08 ENCOUNTER — TELEPHONE (OUTPATIENT)
Dept: OBGYN CLINIC | Facility: CLINIC | Age: 26
End: 2021-11-08

## 2021-11-09 ENCOUNTER — ROUTINE PRENATAL (OUTPATIENT)
Dept: OBGYN CLINIC | Facility: CLINIC | Age: 26
End: 2021-11-09
Payer: COMMERCIAL

## 2021-11-09 VITALS
HEART RATE: 102 BPM | WEIGHT: 190 LBS | HEIGHT: 64.75 IN | DIASTOLIC BLOOD PRESSURE: 64 MMHG | BODY MASS INDEX: 32.04 KG/M2 | SYSTOLIC BLOOD PRESSURE: 110 MMHG

## 2021-11-09 DIAGNOSIS — Z3A.38 38 WEEKS GESTATION OF PREGNANCY: ICD-10-CM

## 2021-11-09 DIAGNOSIS — Z34.93 ENCOUNTER FOR SUPERVISION OF NORMAL PREGNANCY IN THIRD TRIMESTER, UNSPECIFIED GRAVIDITY: Primary | ICD-10-CM

## 2021-11-09 DIAGNOSIS — Z01.812 PRE-PROCEDURAL LABORATORY EXAMINATION: ICD-10-CM

## 2021-11-09 PROCEDURE — 3078F DIAST BP <80 MM HG: CPT | Performed by: OBSTETRICS & GYNECOLOGY

## 2021-11-09 PROCEDURE — 81002 URINALYSIS NONAUTO W/O SCOPE: CPT | Performed by: OBSTETRICS & GYNECOLOGY

## 2021-11-09 PROCEDURE — 3074F SYST BP LT 130 MM HG: CPT | Performed by: OBSTETRICS & GYNECOLOGY

## 2021-11-09 PROCEDURE — 3008F BODY MASS INDEX DOCD: CPT | Performed by: OBSTETRICS & GYNECOLOGY

## 2021-11-09 NOTE — PROGRESS NOTES
Patient c/o unable to sleep at night desires IOL, schedule 11/17/21 cytotec 8:30 pm  - GBS done  -transferred care at 30w6d  -last pap 8/2020 NILM  -NIPT normal, GIRL  -normal anatomy US  -1h GTT normal, Hgb 12.5, 3rd tri HIV neg  -Tdap, flu vaccine and HI

## 2021-11-11 ENCOUNTER — TELEPHONE (OUTPATIENT)
Dept: OBGYN CLINIC | Facility: CLINIC | Age: 26
End: 2021-11-11

## 2021-11-16 ENCOUNTER — LAB ENCOUNTER (OUTPATIENT)
Dept: LAB | Age: 26
End: 2021-11-16
Attending: OBSTETRICS & GYNECOLOGY
Payer: COMMERCIAL

## 2021-11-16 ENCOUNTER — TELEPHONE (OUTPATIENT)
Dept: OBGYN UNIT | Facility: HOSPITAL | Age: 26
End: 2021-11-16

## 2021-11-16 ENCOUNTER — ROUTINE PRENATAL (OUTPATIENT)
Dept: OBGYN CLINIC | Facility: CLINIC | Age: 26
End: 2021-11-16
Payer: COMMERCIAL

## 2021-11-16 VITALS
WEIGHT: 193 LBS | BODY MASS INDEX: 32.55 KG/M2 | DIASTOLIC BLOOD PRESSURE: 64 MMHG | HEART RATE: 105 BPM | HEIGHT: 64.75 IN | SYSTOLIC BLOOD PRESSURE: 100 MMHG

## 2021-11-16 DIAGNOSIS — Z3A.39 39 WEEKS GESTATION OF PREGNANCY: Primary | ICD-10-CM

## 2021-11-16 DIAGNOSIS — Z01.812 PRE-PROCEDURAL LABORATORY EXAMINATION: ICD-10-CM

## 2021-11-16 PROCEDURE — 81002 URINALYSIS NONAUTO W/O SCOPE: CPT | Performed by: OBSTETRICS & GYNECOLOGY

## 2021-11-16 PROCEDURE — 3078F DIAST BP <80 MM HG: CPT | Performed by: OBSTETRICS & GYNECOLOGY

## 2021-11-16 PROCEDURE — 3074F SYST BP LT 130 MM HG: CPT | Performed by: OBSTETRICS & GYNECOLOGY

## 2021-11-16 PROCEDURE — 3008F BODY MASS INDEX DOCD: CPT | Performed by: OBSTETRICS & GYNECOLOGY

## 2021-11-16 NOTE — PROGRESS NOTES
11/16/21 0948   Call Complete   Call Complete Yes   Date Call Complete 11/16/21   Time Call Complete [phone]   Call Comments All covid precautions d/w patient. Covid test being performed today.

## 2021-11-18 ENCOUNTER — ANESTHESIA EVENT (OUTPATIENT)
Dept: OBGYN UNIT | Facility: HOSPITAL | Age: 26
End: 2021-11-18
Payer: COMMERCIAL

## 2021-11-18 ENCOUNTER — HOSPITAL ENCOUNTER (INPATIENT)
Facility: HOSPITAL | Age: 26
LOS: 2 days | Discharge: HOME | End: 2021-11-20
Attending: OBSTETRICS & GYNECOLOGY | Admitting: OBSTETRICS & GYNECOLOGY
Payer: COMMERCIAL

## 2021-11-18 ENCOUNTER — APPOINTMENT (OUTPATIENT)
Dept: OBGYN CLINIC | Facility: HOSPITAL | Age: 26
End: 2021-11-18
Payer: COMMERCIAL

## 2021-11-18 ENCOUNTER — ANESTHESIA (OUTPATIENT)
Dept: OBGYN UNIT | Facility: HOSPITAL | Age: 26
End: 2021-11-18
Payer: COMMERCIAL

## 2021-11-18 PROBLEM — Z34.90 PREGNANCY: Status: ACTIVE | Noted: 2021-11-18

## 2021-11-18 PROCEDURE — 3E0P7VZ INTRODUCTION OF HORMONE INTO FEMALE REPRODUCTIVE, VIA NATURAL OR ARTIFICIAL OPENING: ICD-10-PCS | Performed by: STUDENT IN AN ORGANIZED HEALTH CARE EDUCATION/TRAINING PROGRAM

## 2021-11-18 PROCEDURE — 59400 OBSTETRICAL CARE: CPT | Performed by: STUDENT IN AN ORGANIZED HEALTH CARE EDUCATION/TRAINING PROGRAM

## 2021-11-18 PROCEDURE — 0UQMXZZ REPAIR VULVA, EXTERNAL APPROACH: ICD-10-PCS | Performed by: STUDENT IN AN ORGANIZED HEALTH CARE EDUCATION/TRAINING PROGRAM

## 2021-11-18 RX ORDER — ONDANSETRON 2 MG/ML
4 INJECTION INTRAMUSCULAR; INTRAVENOUS EVERY 6 HOURS PRN
Status: DISCONTINUED | OUTPATIENT
Start: 2021-11-18 | End: 2021-11-18

## 2021-11-18 RX ORDER — HYDROMORPHONE HYDROCHLORIDE 1 MG/ML
1 INJECTION, SOLUTION INTRAMUSCULAR; INTRAVENOUS; SUBCUTANEOUS EVERY 2 HOUR PRN
Status: DISCONTINUED | OUTPATIENT
Start: 2021-11-18 | End: 2021-11-18

## 2021-11-18 RX ORDER — SODIUM CHLORIDE, SODIUM LACTATE, POTASSIUM CHLORIDE, CALCIUM CHLORIDE 600; 310; 30; 20 MG/100ML; MG/100ML; MG/100ML; MG/100ML
INJECTION, SOLUTION INTRAVENOUS CONTINUOUS
Status: DISCONTINUED | OUTPATIENT
Start: 2021-11-18 | End: 2021-11-18

## 2021-11-18 RX ORDER — SIMETHICONE 80 MG
80 TABLET,CHEWABLE ORAL 3 TIMES DAILY PRN
Status: DISCONTINUED | OUTPATIENT
Start: 2021-11-18 | End: 2021-11-20

## 2021-11-18 RX ORDER — ACETAMINOPHEN 500 MG
500 TABLET ORAL EVERY 6 HOURS PRN
Status: DISCONTINUED | OUTPATIENT
Start: 2021-11-18 | End: 2021-11-18

## 2021-11-18 RX ORDER — NALBUPHINE HCL 10 MG/ML
2.5 AMPUL (ML) INJECTION
Status: DISCONTINUED | OUTPATIENT
Start: 2021-11-18 | End: 2021-11-18

## 2021-11-18 RX ORDER — CHOLECALCIFEROL (VITAMIN D3) 25 MCG
1 TABLET,CHEWABLE ORAL DAILY
Status: DISCONTINUED | OUTPATIENT
Start: 2021-11-18 | End: 2021-11-20

## 2021-11-18 RX ORDER — AMMONIA INHALANTS 0.04 G/.3ML
0.3 INHALANT RESPIRATORY (INHALATION) AS NEEDED
Status: DISCONTINUED | OUTPATIENT
Start: 2021-11-18 | End: 2021-11-18

## 2021-11-18 RX ORDER — BUPIVACAINE HCL/0.9 % NACL/PF 0.25 %
5 PLASTIC BAG, INJECTION (ML) EPIDURAL AS NEEDED
Status: DISCONTINUED | OUTPATIENT
Start: 2021-11-18 | End: 2021-11-18

## 2021-11-18 RX ORDER — TRISODIUM CITRATE DIHYDRATE AND CITRIC ACID MONOHYDRATE 500; 334 MG/5ML; MG/5ML
30 SOLUTION ORAL AS NEEDED
Status: DISCONTINUED | OUTPATIENT
Start: 2021-11-18 | End: 2021-11-18

## 2021-11-18 RX ORDER — ACETAMINOPHEN 500 MG
1000 TABLET ORAL EVERY 6 HOURS PRN
Status: DISCONTINUED | OUTPATIENT
Start: 2021-11-18 | End: 2021-11-20

## 2021-11-18 RX ORDER — IBUPROFEN 600 MG/1
600 TABLET ORAL EVERY 6 HOURS PRN
Status: DISCONTINUED | OUTPATIENT
Start: 2021-11-18 | End: 2021-11-18

## 2021-11-18 RX ORDER — IBUPROFEN 600 MG/1
600 TABLET ORAL EVERY 6 HOURS
Status: DISCONTINUED | OUTPATIENT
Start: 2021-11-18 | End: 2021-11-20

## 2021-11-18 RX ORDER — TERBUTALINE SULFATE 1 MG/ML
0.25 INJECTION, SOLUTION SUBCUTANEOUS AS NEEDED
Status: DISCONTINUED | OUTPATIENT
Start: 2021-11-18 | End: 2021-11-18

## 2021-11-18 RX ORDER — DOCUSATE SODIUM 100 MG/1
100 CAPSULE, LIQUID FILLED ORAL
Status: DISCONTINUED | OUTPATIENT
Start: 2021-11-18 | End: 2021-11-20

## 2021-11-18 RX ORDER — DEXTROSE, SODIUM CHLORIDE, SODIUM LACTATE, POTASSIUM CHLORIDE, AND CALCIUM CHLORIDE 5; .6; .31; .03; .02 G/100ML; G/100ML; G/100ML; G/100ML; G/100ML
INJECTION, SOLUTION INTRAVENOUS AS NEEDED
Status: DISCONTINUED | OUTPATIENT
Start: 2021-11-18 | End: 2021-11-18

## 2021-11-18 RX ORDER — HYDROMORPHONE HYDROCHLORIDE 1 MG/ML
INJECTION, SOLUTION INTRAMUSCULAR; INTRAVENOUS; SUBCUTANEOUS
Status: COMPLETED
Start: 2021-11-18 | End: 2021-11-18

## 2021-11-18 RX ORDER — BISACODYL 10 MG
10 SUPPOSITORY, RECTAL RECTAL ONCE AS NEEDED
Status: DISCONTINUED | OUTPATIENT
Start: 2021-11-18 | End: 2021-11-20

## 2021-11-18 NOTE — ANESTHESIA PROCEDURE NOTES
Labor Analgesia  Performed by: Fco Carcamo MD  Authorized by: Fco Carcamo MD       General Information and Staff    Start Time:  11/18/2021 2:50 PM  End Time:  11/18/2021 3:04 PM  Anesthesiologist:  Fco Carcamo MD  Performed by:   Anesthesiol

## 2021-11-18 NOTE — H&P
[facility]  Obstetrics and Gynecology  History & Physical    Alexi Chowdhury Patient Status:  Inpatient    3/31/1995 MRN [id number]   Location [address] [facility] Attending Cassy Atwood. [place] 91 Day 0 PCP Lily Casas BMI.    General: NAD  FHT: Cat 1, reactive  TOCO: some irritability    SVE: 0.5 / thick / high per RN      Prenatal Labs Brief Review   Blood Type:   Lab Results   Component Value Date    ABO A 11/18/2021    RH Positive 11/18/2021     GBS:  Negative      I

## 2021-11-18 NOTE — ANESTHESIA PREPROCEDURE EVALUATION
PRE-OP EVALUATION    Patient Name: Rex Edward    Admit Diagnosis: preg state   Pregnancy    Pre-op Diagnosis: * No pre-op diagnosis entered *        Anesthesia Procedure: LABOR ANALGESIA    * No surgeons found in log *    Pre-op vitals review allergies. Anesthesia Evaluation    Patient summary reviewed.     Anesthetic Complications  (-) history of anesthetic complications         GI/Hepatic/Renal                                 Cardiovascular        Exercise tolerance: good     MET: >4

## 2021-11-19 NOTE — PROGRESS NOTES
PPD#1    Pt has no complaints, lochia min   11/19/21  0728   BP: 106/64   Pulse: 71   Resp: 16   Temp: 98 °F (36.7 °C)     Recent Labs   Lab 11/18/21  0814 11/19/21  0802   RBC 4.15 3.98   HGB 13.1 12.3   HCT 37.9 38.1   MCV 91.3 95.7   MCH 31.6 30.9   MCH

## 2021-11-19 NOTE — PAYOR COMM NOTE
--------------  ADMISSION REVIEW     Payor: Myrtle PARIKH  Subscriber #:  [id number]  Authorization Number: [id number]    Admit date: 11/18/21  Admit time:  6:45 AM       REVIEW DOCUMENTATION:  ED Provider Notes    No notes of this type exis History: History reviewed. No pertinent family history.   Social History: Social History    Tobacco Use      Smoking status: Former Smoker        Quit date: 10/5/2020        Years since quittin.1      Smokeless tobacco: Never Used    Alcohol use: Not Cu signed by Pam Keller MD on 11/18/2021  9:50 AM         MEDICATIONS ADMINISTERED IN LAST 1 DAY:  acetaminophen (TYLENOL EXTRA STRENGTH) tab 1,000 mg     Date Action Dose Route User    11/19/2021 0912 Given 1,000 mg Oral Ashwin TREJO RN      B RN    11/18/2021 1300 Rate/Dose Change 4 jerry-units/min Intravenous Alexi Sher RN      prenatal vitamin w/DHA (PNV with DHA) cap 1 capsule     Date Action Dose Route User    11/19/2021 8721 Given 1 capsule Oral Hilda Rudd RN      witch smith vaginal delivery     Details:      Delivery location: delivery room      Delivery Providers    Delivering Clinician: Traci Dial MD          Delivery personnel:  Provider Role   Caitie Woodward, RN Baby Nurse   Rosa Isela Xiao RN Delivery year old  at 39w2d admitted for elective induction of labor. Pregnancy uncomplicated. Received 1 dose cytotec for cervical ripening. Membranes ruptured spontaneously. Pitocin started, patient progressed quickly to complete.  Uncomplicated  of vig

## 2021-11-19 NOTE — PROGRESS NOTES
Patient up and about, in stable condition, will continue to assist as needed.  Will endorse to [facility]

## 2021-11-19 NOTE — CM/SW NOTE
met with patient to see if they had a PCP for infant. Couple has [employer]. Couple needed list of PCP for infant.  printed list of PCP for couple to select from. No other needs at this time.

## 2021-11-20 VITALS
DIASTOLIC BLOOD PRESSURE: 71 MMHG | WEIGHT: 193 LBS | SYSTOLIC BLOOD PRESSURE: 110 MMHG | HEART RATE: 70 BPM | TEMPERATURE: 98 F | BODY MASS INDEX: 32.55 KG/M2 | RESPIRATION RATE: 16 BRPM | HEIGHT: 64.76 IN

## 2021-11-20 NOTE — PROGRESS NOTES
10 month old Developmental Screening    Ages and Stages totals:     Communication total:  5   Gross Motor total:30   Fine Motor total: 40   Problem Solving total: 20   Personal-Social total:  50     Concerns? Does your child attend ? No  Who live with your child at the home?  parents  Where else does the child spend time? [address] mother  Does anyone smoke at home? Yes  Does your child ride in a car seat facing backwards  Yes  Do you have smoke detectors/ CO detectors? Yes  Do you have pets at home? yes - cat  Are there guns at home? Yes  Does your baby sleep alone in his/her crib or bassinet? Yes  Does your baby ever sleep with you? No  Are there any blankets, toys, bumper pads, or other objects in your baby's bed? Yes  Do you place your baby on his/her back for sleep all the time? Yes  How many ounces of formula does your baby take at a time? 6, Which formula? simalac sensitive  Or how many minutes does your baby spend at the breast?  n/a  If your baby is , do you give him/her Vit D drops? N/A  Does he/she drink juice? yes  Does your child drink from a cup? Yes  Does your child eat a variety of food? Yes  How many times a day do you brush your child's teeth:  2  Is your home child proofed (outlet covers in place, medications/ put away and looked, etc . . . ? )  Yes  Do you ever worry that your food will run out before you get money or food stamps to get more? No  Has anything bad, sad, or scary happened to you or your children since your last visit? No  What concerns would you like to discuss today?   No [facility]  Post-Partum Progress Note    [place] Patient Status:  Inpatient    3/31/1995 MRN [id number]   [facility] 2SW-J Attending Nelli Owens, [address] [facility] Day # 2 PCP Flores Mandujano MD     SUBJECTIVE:  Patient doi

## 2021-11-23 ENCOUNTER — TELEPHONE (OUTPATIENT)
Dept: OBGYN UNIT | Facility: HOSPITAL | Age: 26
End: 2021-11-23

## 2021-11-24 ENCOUNTER — TELEPHONE (OUTPATIENT)
Dept: OBGYN UNIT | Facility: HOSPITAL | Age: 26
End: 2021-11-24

## 2021-11-26 ENCOUNTER — TELEPHONE (OUTPATIENT)
Dept: OBGYN UNIT | Facility: HOSPITAL | Age: 26
End: 2021-11-26

## 2021-11-26 NOTE — PROGRESS NOTES
Reviewed self and infant care w / mom, she verbalizes understanding of instructions reviewed. Encourage to follow up w/ MDs as directed and w/ questions/concerns. Enc to join ct, lives w .

## 2021-12-23 ENCOUNTER — HOSPITAL ENCOUNTER (OUTPATIENT)
Age: 26
Discharge: HOME | End: 2021-12-23
Payer: COMMERCIAL

## 2021-12-23 VITALS
OXYGEN SATURATION: 96 % | HEART RATE: 93 BPM | RESPIRATION RATE: 16 BRPM | SYSTOLIC BLOOD PRESSURE: 98 MMHG | DIASTOLIC BLOOD PRESSURE: 70 MMHG | BODY MASS INDEX: 29 KG/M2 | WEIGHT: 171.38 LBS | TEMPERATURE: 98 F

## 2021-12-23 DIAGNOSIS — U07.1 COVID-19: ICD-10-CM

## 2021-12-23 DIAGNOSIS — Z20.822 ENCOUNTER FOR LABORATORY TESTING FOR COVID-19 VIRUS: Primary | ICD-10-CM

## 2021-12-23 PROCEDURE — 99213 OFFICE O/P EST LOW 20 MIN: CPT | Performed by: NURSE PRACTITIONER

## 2021-12-23 PROCEDURE — U0002 COVID-19 LAB TEST NON-CDC: HCPCS | Performed by: NURSE PRACTITIONER

## 2021-12-23 NOTE — ED PROVIDER NOTES
Patient Seen in: [facility]      History   Patient presents with:  Runny Nose  Fatigue  Body ache and/or chills    Stated Complaint: runny nose body aches    Subjective:   82-year-old female presents today with URI symptoms and fatigue with po Head: Normocephalic. Right Ear: Tympanic membrane and ear canal normal.      Left Ear: Tympanic membrane and ear canal normal.      Nose: Mucosal edema and congestion present. Mouth/Throat:      Pharynx: Uvula midline.  Posterior oropharyngeal candice [id number]  [phone]    In 2 weeks  As needed          Medications Prescribed:  Current Discharge Medication List

## 2021-12-23 NOTE — ED INITIAL ASSESSMENT (HPI)
Patient states her  tested + for Covid with a home test yesterday. C/O fatigue, body aches and runny nose today.  She had a negative home covid test today

## 2022-01-19 ENCOUNTER — TELEPHONE (OUTPATIENT)
Dept: OBGYN CLINIC | Facility: CLINIC | Age: 27
End: 2022-01-19

## 2022-01-19 NOTE — TELEPHONE ENCOUNTER
Pt came into the office to  a copy of her records. Pt states she had an insurance change and is transferring care. Please advise if needed. Records were printed and given to pt as she requested.  me

## 2022-02-02 NOTE — ANESTHESIA POSTPROCEDURE EVALUATION
[address] Patient Status:  Inpatient   Age/Gender 27year old female MRN [id number]   [facility] 2SW-J Attending No att. providers found   [facility] Day # 2 PCP Chioma Moore MD       Anesthesia Post-op Note

## 2022-02-02 NOTE — ANESTHESIA POSTPROCEDURE EVALUATION
[facility] Patient Status:  Inpatient   Age/Gender 27year old female MRN [id number]   [facility] 2SW-J Attending No att. providers found   [address] Day # 2 PCP Dav Zacarias MD       Anesthesia Post-op Note

## 2022-03-18 NOTE — L&D DELIVERY NOTE
Fareed Preston, Girl [[id number]]    Labor Events     labor?: No  Cervical ripening type: Misoprostol  Antibiotics received during labor?: No  Rupture date/time: 2021 1026     Rupture type: SROM  Fluid color: Clear  Induction: Misoprostol  In Refilled per protocol.    Minute:  5 Minute:  10 Minute:  15 Minute:  20 Minute:    Skin color: 1  1       Heart rate: 2  2       Reflex irritablity: 2  2       Muscle tone: 2  2       Respiratory effort: 2  2       Total: 9  9          Apgars assigned by: Ai Sullivan  [place] dispo

## 2023-02-23 ENCOUNTER — TELEPHONE (OUTPATIENT)
Dept: OBGYN CLINIC | Facility: CLINIC | Age: 28
End: 2023-02-23

## 2023-02-23 NOTE — TELEPHONE ENCOUNTER
Established patient. LMP: 1/13/23  GA: 5 6/7 wks    Contacted patient. She reports cramping that started a couple of days ago. More than just mild cramping and woke her up last night. Does not seem to improve with hydration. Denies bleeding. Persistent cramping and her abdomen is tender to the touch. Pain is 4-5/10 currently  No fever, but was having chills last night especially. Does have an appendix yet. Advised to proceed to ER for evaluation. Patient and her spouse agree with plan.

## 2023-02-23 NOTE — TELEPHONE ENCOUNTER
Patient calling to initiate prenatal care  LMP 1/13/23  Patient is 7-8 weeks on 3/10/23  Confirmation Ultrasound and Appointment scheduled on   Future Appointments   Date Time Provider Lily Restrepo   3/20/2023  2:45 PM EMG OB US PLFD [facility] OB/GYN P [facility] [address]   3/20/2023  4:30 PM Kelin Garza MD EM[facility] OB/GYN P [facility] [address]       Any history of ectopic pregnancy? no  Any history of miscarriage? no  Any medications that you are taking on a regular basis other than prenatal vitamins? prenatal (if not taking prenatal vitamins, encourage patient to start taking.)  Any bleeding since the first day of last LMP and your positive pregnancy test? no    Insurance bcbs hmo   patient having a lot of cramping and would like to talk to a nurse. She doesn't remember this from her last pregnancy.  Please advise

## 2023-04-05 ENCOUNTER — INITIAL PRENATAL (OUTPATIENT)
Dept: OBGYN CLINIC | Facility: CLINIC | Age: 28
End: 2023-04-05
Payer: COMMERCIAL

## 2023-04-05 VITALS
DIASTOLIC BLOOD PRESSURE: 66 MMHG | HEIGHT: 65 IN | BODY MASS INDEX: 26.16 KG/M2 | SYSTOLIC BLOOD PRESSURE: 104 MMHG | HEART RATE: 95 BPM | WEIGHT: 157 LBS

## 2023-04-05 DIAGNOSIS — Z36.9 ANTENATAL SCREENING ENCOUNTER: ICD-10-CM

## 2023-04-05 DIAGNOSIS — Z12.4 SCREENING FOR CERVICAL CANCER: ICD-10-CM

## 2023-04-05 DIAGNOSIS — Z11.3 SCREEN FOR STD (SEXUALLY TRANSMITTED DISEASE): ICD-10-CM

## 2023-04-05 DIAGNOSIS — Z34.90 PRENATAL CARE, ANTEPARTUM: Primary | ICD-10-CM

## 2023-04-05 PROBLEM — O9A.213 TRAUMATIC INJURY DURING PREGNANCY IN THIRD TRIMESTER: Status: RESOLVED | Noted: 2021-10-05 | Resolved: 2023-04-05

## 2023-04-05 PROBLEM — Z34.03 PREGNANCY, FIRST, THIRD TRIMESTER: Status: RESOLVED | Noted: 2021-07-09 | Resolved: 2023-04-05

## 2023-04-05 LAB
GLUCOSE (URINE DIPSTICK): NEGATIVE MG/DL
MULTISTIX LOT#: NORMAL NUMERIC

## 2023-04-05 PROCEDURE — 3074F SYST BP LT 130 MM HG: CPT | Performed by: NURSE PRACTITIONER

## 2023-04-05 PROCEDURE — 3008F BODY MASS INDEX DOCD: CPT | Performed by: NURSE PRACTITIONER

## 2023-04-05 PROCEDURE — 81002 URINALYSIS NONAUTO W/O SCOPE: CPT | Performed by: NURSE PRACTITIONER

## 2023-04-05 PROCEDURE — 3078F DIAST BP <80 MM HG: CPT | Performed by: NURSE PRACTITIONER

## 2023-04-05 RX ORDER — RIBOFLAVIN (VITAMIN B2) 100 MG
100 TABLET ORAL DAILY
COMMUNITY

## 2023-04-06 ENCOUNTER — TELEPHONE (OUTPATIENT)
Dept: OBGYN CLINIC | Facility: CLINIC | Age: 28
End: 2023-04-06

## 2023-04-06 DIAGNOSIS — Z36.8A ENCOUNTER FOR ANTENATAL SCREENING FOR OTHER GENETIC DEFECT: Primary | ICD-10-CM

## 2023-04-06 NOTE — TELEPHONE ENCOUNTER
Order placed via Epic. Faxed to number provided; fax confirmation received. No genetic hx. Per OV: Pt will call insurance for coverage. She is aware that she will need to call to have the order placed.

## 2023-04-08 LAB
CHLAMYDIA TRACHOMATIS$RNA, TMA: NOT DETECTED
NEISSERIA GONORRHOEAE$RNA, TMA: NOT DETECTED

## 2023-04-10 ENCOUNTER — TELEPHONE (OUTPATIENT)
Dept: OBGYN CLINIC | Facility: CLINIC | Age: 28
End: 2023-04-10

## 2023-04-10 NOTE — TELEPHONE ENCOUNTER
Pt states that lately she has been having red/ dry spots on her face. Wondering if related to pregnancy and what she can do?     Future Appointments   Date Time Provider Lily Restrepo   5/3/2023  2:00 PM JOSE Zaragoza EM[facility] OB/GYN P [facility] [address]

## 2023-04-10 NOTE — TELEPHONE ENCOUNTER
Patient is having spots on face, only on face. They do itch, not painful. They are there all the time, but the itching is worse in the morning when she wakes up. Patient also is having acne very bad. Patient states she had both of these things in previous pregnancy. [employer] message sent to patient with what she can take for the rash and a soap to use for the acne. Patient requested a [facility] message be sent to her with the information.

## 2023-04-11 ENCOUNTER — TELEPHONE (OUTPATIENT)
Dept: OBGYN CLINIC | Facility: CLINIC | Age: 28
End: 2023-04-11

## 2023-04-11 PROBLEM — Z34.80 SUPERVISION OF OTHER NORMAL PREGNANCY, ANTEPARTUM: Status: ACTIVE | Noted: 2023-04-11

## 2023-04-11 NOTE — TELEPHONE ENCOUNTER
Per routing message from Dr. Mohit Joshi results noted. [employer] message sent.   Without gender info Pt tolerated fluids well  Pt's port de-accessed and flushed, brisk blood return noted  Pt given AVS and aware of next appts

## 2023-04-12 NOTE — TELEPHONE ENCOUNTER
Pt called stating that her best friend Simarn Hickman (ph. [phone]) would be calling the office to obtain gender only results. She gives verbal permission to get gender.

## 2023-05-03 ENCOUNTER — ULTRASOUND ENCOUNTER (OUTPATIENT)
Dept: OBGYN CLINIC | Facility: CLINIC | Age: 28
End: 2023-05-03
Payer: COMMERCIAL

## 2023-05-03 ENCOUNTER — ROUTINE PRENATAL (OUTPATIENT)
Dept: OBGYN CLINIC | Facility: CLINIC | Age: 28
End: 2023-05-03
Payer: COMMERCIAL

## 2023-05-03 ENCOUNTER — TELEPHONE (OUTPATIENT)
Dept: OBGYN CLINIC | Facility: CLINIC | Age: 28
End: 2023-05-03

## 2023-05-03 VITALS
DIASTOLIC BLOOD PRESSURE: 70 MMHG | SYSTOLIC BLOOD PRESSURE: 110 MMHG | BODY MASS INDEX: 25.16 KG/M2 | HEIGHT: 65 IN | HEART RATE: 65 BPM | WEIGHT: 151 LBS

## 2023-05-03 DIAGNOSIS — Z32.01 PREGNANCY EXAMINATION OR TEST, POSITIVE RESULT: ICD-10-CM

## 2023-05-03 DIAGNOSIS — Z36.9 PRENATAL SCREENING ENCOUNTER: Primary | ICD-10-CM

## 2023-05-03 DIAGNOSIS — Z36.9 ENCOUNTER FOR ANTENATAL SCREENING OF MOTHER: Primary | ICD-10-CM

## 2023-05-03 DIAGNOSIS — N91.1 SECONDARY AMENORRHEA: Primary | ICD-10-CM

## 2023-05-03 DIAGNOSIS — Z34.80 SUPERVISION OF OTHER NORMAL PREGNANCY, ANTEPARTUM: ICD-10-CM

## 2023-05-03 PROCEDURE — 76815 OB US LIMITED FETUS(S): CPT | Performed by: OBSTETRICS & GYNECOLOGY

## 2023-05-03 PROCEDURE — 3078F DIAST BP <80 MM HG: CPT | Performed by: NURSE PRACTITIONER

## 2023-05-03 PROCEDURE — 3008F BODY MASS INDEX DOCD: CPT | Performed by: NURSE PRACTITIONER

## 2023-05-03 PROCEDURE — 3074F SYST BP LT 130 MM HG: CPT | Performed by: NURSE PRACTITIONER

## 2023-05-03 PROCEDURE — 81002 URINALYSIS NONAUTO W/O SCOPE: CPT | Performed by: NURSE PRACTITIONER

## 2023-05-03 NOTE — TELEPHONE ENCOUNTER
Please reorder labs under [facility].  Lab updated in system

## 2023-05-03 NOTE — PROGRESS NOTES
LUCY SWAN- none yet  Doing well, concerned about her due date because she wasn't able to make her first trimester Us appointment. She had a Mirena IUD removed 11/2022 and had 3 menses in December and her last in January. The last one was much shorter than her norm. Will obtain an US for dating but advised the margin of error is greater and not as accurate as it would have been early on. No complaints.  No LOF/VB/uctx  Genetic testing- she completed the MaterniT 21, she declines the AFP  Anatomy Scan 4+ weeks with LUCY

## 2023-05-09 ENCOUNTER — LAB ENCOUNTER (OUTPATIENT)
Dept: LAB | Age: 28
End: 2023-05-09
Attending: NURSE PRACTITIONER
Payer: COMMERCIAL

## 2023-05-09 DIAGNOSIS — Z36.9 ENCOUNTER FOR ANTENATAL SCREENING OF MOTHER: ICD-10-CM

## 2023-05-09 LAB
ANTIBODY SCREEN: NEGATIVE
BASOPHILS # BLD AUTO: 0.06 X10(3) UL (ref 0–0.2)
BASOPHILS NFR BLD AUTO: 0.6 %
EOSINOPHIL # BLD AUTO: 0.05 X10(3) UL (ref 0–0.7)
EOSINOPHIL NFR BLD AUTO: 0.5 %
ERYTHROCYTE [DISTWIDTH] IN BLOOD BY AUTOMATED COUNT: 13.1 %
HBV SURFACE AG SER-ACNC: <0.1 [IU]/L
HBV SURFACE AG SERPL QL IA: NONREACTIVE
HCT VFR BLD AUTO: 39.4 %
HCV AB SERPL QL IA: NONREACTIVE
HGB BLD-MCNC: 13.4 G/DL
IMM GRANULOCYTES # BLD AUTO: 0.02 X10(3) UL (ref 0–1)
IMM GRANULOCYTES NFR BLD: 0.2 %
LYMPHOCYTES # BLD AUTO: 2.1 X10(3) UL (ref 1–4)
LYMPHOCYTES NFR BLD AUTO: 22.4 %
MCH RBC QN AUTO: 30.2 PG (ref 26–34)
MCHC RBC AUTO-ENTMCNC: 34 G/DL (ref 31–37)
MCV RBC AUTO: 88.9 FL
MONOCYTES # BLD AUTO: 0.43 X10(3) UL (ref 0.1–1)
MONOCYTES NFR BLD AUTO: 4.6 %
NEUTROPHILS # BLD AUTO: 6.72 X10 (3) UL (ref 1.5–7.7)
NEUTROPHILS # BLD AUTO: 6.72 X10(3) UL (ref 1.5–7.7)
NEUTROPHILS NFR BLD AUTO: 71.7 %
PLATELET # BLD AUTO: 300 10(3)UL (ref 150–450)
RBC # BLD AUTO: 4.43 X10(6)UL
RH BLOOD TYPE: POSITIVE
RUBV IGG SER QL: POSITIVE
RUBV IGG SER-ACNC: 88.8 IU/ML (ref 10–?)
T PALLIDUM AB SER QL IA: NONREACTIVE
WBC # BLD AUTO: 9.4 X10(3) UL (ref 4–11)

## 2023-05-09 PROCEDURE — 86900 BLOOD TYPING SEROLOGIC ABO: CPT | Performed by: NURSE PRACTITIONER

## 2023-05-09 PROCEDURE — 85025 COMPLETE CBC W/AUTO DIFF WBC: CPT | Performed by: NURSE PRACTITIONER

## 2023-05-09 PROCEDURE — 86803 HEPATITIS C AB TEST: CPT | Performed by: NURSE PRACTITIONER

## 2023-05-09 PROCEDURE — 86762 RUBELLA ANTIBODY: CPT | Performed by: NURSE PRACTITIONER

## 2023-05-09 PROCEDURE — 86780 TREPONEMA PALLIDUM: CPT | Performed by: NURSE PRACTITIONER

## 2023-05-09 PROCEDURE — 86901 BLOOD TYPING SEROLOGIC RH(D): CPT | Performed by: NURSE PRACTITIONER

## 2023-05-09 PROCEDURE — 87340 HEPATITIS B SURFACE AG IA: CPT | Performed by: NURSE PRACTITIONER

## 2023-05-09 PROCEDURE — 86850 RBC ANTIBODY SCREEN: CPT | Performed by: NURSE PRACTITIONER

## 2023-05-09 PROCEDURE — 87389 HIV-1 AG W/HIV-1&-2 AB AG IA: CPT | Performed by: NURSE PRACTITIONER

## 2023-06-05 ENCOUNTER — ULTRASOUND ENCOUNTER (OUTPATIENT)
Dept: OBGYN CLINIC | Facility: CLINIC | Age: 28
End: 2023-06-05
Payer: COMMERCIAL

## 2023-06-05 ENCOUNTER — ROUTINE PRENATAL (OUTPATIENT)
Dept: OBGYN CLINIC | Facility: CLINIC | Age: 28
End: 2023-06-05
Payer: COMMERCIAL

## 2023-06-05 VITALS
BODY MASS INDEX: 27 KG/M2 | DIASTOLIC BLOOD PRESSURE: 64 MMHG | HEART RATE: 87 BPM | SYSTOLIC BLOOD PRESSURE: 114 MMHG | WEIGHT: 162.63 LBS

## 2023-06-05 DIAGNOSIS — Z34.90 PRENATAL CARE, ANTEPARTUM: ICD-10-CM

## 2023-06-05 DIAGNOSIS — Z34.80 SUPERVISION OF OTHER NORMAL PREGNANCY, ANTEPARTUM: Primary | ICD-10-CM

## 2023-06-05 NOTE — PROGRESS NOTES
LUCY    GA: 20w3d   23  1512   BP: 114/64   Pulse: 87   Weight: 162 lb 9.6 oz (73.8 kg)       Doing well  No complaints. Denies LOF/VB/uctx  RH  +  Genetic testing cffDNA neg. MSAFP declined. Anatomy Scan  unremarkable   CBC and 1 hr GTT order and instructions provided  Patient currently undergoing therapy patient process. Request a letter regarding COVID-19 vaccination. Discussed with patient recommendations for COVID-19 vaccination in pregnancy. However, patient prefers to decline vaccination until after delivery. Letter provided stating acog recommendation and patient's preference. Reports occasional vomiting. Reports feeling well after emesis. Discussed with patient possible acid reflux symptoms. Recommend Pepcid daily. Recommend Tums as needed. Precautions provided. Problem List Items Addressed This Visit        Gravid and     Supervision of other normal pregnancy, antepartum - Primary    Relevant Orders    US OB COMPLETE 2ND TRIMESTER >14 WKS EMG ONLY 37132 (Completed)    CBC (WITH DIFF, PLATELET) REFLEX TO FERRITIN    GLUCOSE 1HR OB   Other Visit Diagnoses     Prenatal care, antepartum        Relevant Orders    URINALYSIS NONAUTO W/O SCOPE (OB URISTIX) (Completed)            RTC in 4 wks         Note to patient and family   The [facility] makes medical notes available to patients in the interest of transparency. However, please be advised that this is a medical document. It is intended as mabu-en-jvik communication. It is written and medical language may contain abbreviations or verbiage that are technical and unfamiliar. It may appear blunt or direct. Medical documents are intended to carry relevant information, facts as evident, and the clinical opinion of the practitioner.

## 2023-07-19 ENCOUNTER — LAB ENCOUNTER (OUTPATIENT)
Dept: LAB | Age: 28
End: 2023-07-19
Attending: OBSTETRICS & GYNECOLOGY
Payer: COMMERCIAL

## 2023-07-19 DIAGNOSIS — Z34.80 SUPERVISION OF OTHER NORMAL PREGNANCY, ANTEPARTUM: ICD-10-CM

## 2023-07-19 LAB
BASOPHILS # BLD AUTO: 0.04 X10(3) UL (ref 0–0.2)
BASOPHILS NFR BLD AUTO: 0.4 %
EOSINOPHIL # BLD AUTO: 0.04 X10(3) UL (ref 0–0.7)
EOSINOPHIL NFR BLD AUTO: 0.4 %
ERYTHROCYTE [DISTWIDTH] IN BLOOD BY AUTOMATED COUNT: 12.7 %
GLUCOSE 1H P GLC SERPL-MCNC: 90 MG/DL
HCT VFR BLD AUTO: 38.3 %
HGB BLD-MCNC: 12.7 G/DL
IMM GRANULOCYTES # BLD AUTO: 0.05 X10(3) UL (ref 0–1)
IMM GRANULOCYTES NFR BLD: 0.5 %
LYMPHOCYTES # BLD AUTO: 2.01 X10(3) UL (ref 1–4)
LYMPHOCYTES NFR BLD AUTO: 18.2 %
MCH RBC QN AUTO: 31 PG (ref 26–34)
MCHC RBC AUTO-ENTMCNC: 33.2 G/DL (ref 31–37)
MCV RBC AUTO: 93.4 FL
MONOCYTES # BLD AUTO: 0.62 X10(3) UL (ref 0.1–1)
MONOCYTES NFR BLD AUTO: 5.6 %
NEUTROPHILS # BLD AUTO: 8.26 X10 (3) UL (ref 1.5–7.7)
NEUTROPHILS # BLD AUTO: 8.26 X10(3) UL (ref 1.5–7.7)
NEUTROPHILS NFR BLD AUTO: 74.9 %
PLATELET # BLD AUTO: 267 10(3)UL (ref 150–450)
RBC # BLD AUTO: 4.1 X10(6)UL
WBC # BLD AUTO: 11 X10(3) UL (ref 4–11)

## 2023-07-19 PROCEDURE — 36415 COLL VENOUS BLD VENIPUNCTURE: CPT

## 2023-07-19 PROCEDURE — 82950 GLUCOSE TEST: CPT

## 2023-07-19 PROCEDURE — 85025 COMPLETE CBC W/AUTO DIFF WBC: CPT

## 2023-07-20 ENCOUNTER — TELEPHONE (OUTPATIENT)
Dept: OBGYN CLINIC | Facility: CLINIC | Age: 28
End: 2023-07-20

## 2023-07-20 NOTE — TELEPHONE ENCOUNTER
Patient calling regarding test results. She said that she called this morning but there was no message. She is worried that she has an infection.  Please call to advise

## 2023-07-20 NOTE — TELEPHONE ENCOUNTER
Patient states concern that she has an infection since her white blood cell count is elevated. Discussed that an elevation in white blood cells can be considered a normal finding in pregnancy. Informed patient that her labs have not been reviewed by a doctor yet and we will get back to her with final results. Patient verbalized understanding.

## 2023-07-26 ENCOUNTER — TELEPHONE (OUTPATIENT)
Dept: OBGYN CLINIC | Facility: CLINIC | Age: 28
End: 2023-07-26

## 2023-07-26 NOTE — TELEPHONE ENCOUNTER
Pt called, she is 28wk ob and her doctor for her [place] wants her to give her Tetanus shot. Pt wants to know if it safe for her to get it.  Please advise

## 2023-07-26 NOTE — TELEPHONE ENCOUNTER
Spoke to patient with assistance from ANISA ID# [id number]. Per chart review- patient had Tdap vaccine on 9/1/2021 and will receive again in our office during a routine OB appointment in her 3rd trimester. Patient requests to have the vaccine at her next visit on 8/8/23 with a printout to show proof. Patient notified that would be fine.

## 2023-07-31 ENCOUNTER — TELEPHONE (OUTPATIENT)
Dept: OBGYN CLINIC | Facility: CLINIC | Age: 28
End: 2023-07-31

## 2023-07-31 NOTE — TELEPHONE ENCOUNTER
Pt called asking if she has had Hepatitis B shot, due to our records it indicates that she has not. [facility]. Pt is requesting if its ok to get the shot?

## 2023-08-07 PROBLEM — E78.2 MIXED HYPERLIPIDEMIA: Status: ACTIVE | Noted: 2022-04-14

## 2023-08-08 ENCOUNTER — ROUTINE PRENATAL (OUTPATIENT)
Dept: OBGYN CLINIC | Facility: CLINIC | Age: 28
End: 2023-08-08
Payer: COMMERCIAL

## 2023-08-08 VITALS
WEIGHT: 177.25 LBS | SYSTOLIC BLOOD PRESSURE: 110 MMHG | BODY MASS INDEX: 30 KG/M2 | HEART RATE: 101 BPM | DIASTOLIC BLOOD PRESSURE: 68 MMHG

## 2023-08-08 DIAGNOSIS — Z36.9 ANTENATAL SCREENING ENCOUNTER: Primary | ICD-10-CM

## 2023-08-08 PROCEDURE — 3078F DIAST BP <80 MM HG: CPT | Performed by: OBSTETRICS & GYNECOLOGY

## 2023-08-08 PROCEDURE — 3074F SYST BP LT 130 MM HG: CPT | Performed by: OBSTETRICS & GYNECOLOGY

## 2023-08-08 NOTE — PATIENT INSTRUCTIONS
LABOR & DELIVERY PRE-REGISTRATION    Thank you for choosing [facility] for you labor and delivery needs. We look forward to caring for you and your family in this exciting time. In order to better care for all of our patients, [facility] recommends that you complete your delivery registration as early in your pregnancy as possible. Registering for your delivery in advance saves you the time of doing so on your day of delivery and allows your care team to be better prepared for your delivery date. For more information about Labor and Delivery at [facility] and to pre-register, visit [address] [employer]--> Search Our Services-->Pregnancy & Baby. TWO WAYS TO REGISTER ONLINE    Epic MyChart allows access to multiple medical organizations, including [facility] and other medical organizations that use the Room system. To add [facility] or any other medical organization, just tap My Organizations at the top left corner of the login page in the [address] [employer], and then click Add Organization. [place] in [address] [place] [address] for your hospital stay through your [employer] account. After logging into [employer], click on Visits. Under Visits, click on [place] for Delivery and follow the directions to register. You may print the confirmation page. If you do not already have a [employer] account, ask our office for an Access Code. Go to [email or web address] and follow the prompts to set up your account. [place] on [email or web address]- Pre-register for your hospital stay through the convenient online form on our website. Go to [email or web address]. Scroll down the page and click on [address] out all of the required information and click submit. You will receive a confirmation of your submission. Questions about registering for your hospital stay? Contact the [facility] & Delivery at [phone].       PRENATAL CLASSES    Both in-person and virtual classes are available. Weeknight and weekend classes are available. Go to [email or web address]   or   [email or web address]  Click on drop down menu on the right side  Scroll down to \"Find a Class or Support Group\"  Scroll down and click on \"[address]\"  Type any of the following in the Search Bar  - [employer]/ [employer]- for the [address] [place] [address] Street (even if you are planning on having an epidural). Topics include labor and birth, breathing techniques, epidurals, postpartum issues. - Virtual Childbirth Express  - Breastfeeding       FETAL MOVEMENT CHART    Although not all women need to perform daily fetal movement counts, if you notice a decrease in fetal activity, you should contact our office immediately. Begin counting fetal movements at 32 weeks of pregnancy. You may find that your baby is more active after eating or drinking. We want you to time how long it takes to feel 10 movements (kicks, flutters, swishes or rolls). You should feel at least 10 movements in 2 hours. You will likely feel 10 movements in less than 2 hours. If you have concerns, you can use the attached table to record movements. Record the time you feel the first movement and the tenth movement. This will help you observe patterns and discover how long it normally takes your baby to move 10 times. Please call us if any of the following occurs: You have not felt the baby move for a couple of hours  It is taking longer each day to get the tenth movement    The main point is we want you to know the characteristics of your baby, so you can tell the doctor or nurse if something different is happening. Again, if you notice a decrease in fetal movement, please give the office a call.     If our office is closed, the answering service will page the doctor on-call.    ------------------------------      Time NEIL MCDERMOTT S Time M T W Th F S S                                                                                                           Time M T W Th F S S                                                                                                           Time M T W Th F S S                                                                                                         VACCINE RECOMMENDATIONS     Pertussis- [facility] has had significant outbreaks of pertussis (whooping cough) for the last few years. Therefore, the CDC recommends that all pregnant women receive a Tdap vaccine during pregnancy, regardless of whether you have received one previously. The vaccine reduces your chances of ela the illness, and also provides some natural immunity to your baby for possible exposures after birth. The best time to get the vaccine is between 27 and 36 weeks. Baby caregivers (grandparents, spouse) should also make sure they are up to date on the vaccine. Influenza- Pregnant women who develop the flu are more prone to serious complications that can affect both mother and baby. The CDC recommends that all women who will be pregnant during flu season (October to May) receive the flu vaccine (injection only, not nasal spray). The vaccine can be given during any stage of pregnancy. Both vaccines are offered in our office, as well as through many pharmacies and primary care offices. Covid-19 Vaccine   If you are pregnant or were recently pregnant, you are more likely to get very sick from COVID-19 than people who are not pregnant. Additionally, if you have COVID-19 during pregnancy, you are more likely to have complications that can affect your pregnancy and developing baby.   You should make sure you have received one up to date bi-valent Pfizer or Moderna vaccine that covers both the original and the Omicron variants of the virus.    COVID-19 vaccination is recommended for everyone ages 7 months and older, including people who are pregnant, breastfeeding, trying to get pregnant now, or who might become pregnant in the future. Evidence shows that COVID-19 vaccination before and during pregnancy is safe and effective and suggests that the benefits of vaccination outweigh any known or potential risks. New data show that vaccination during pregnancy can help protect babies younger than 7 months old, when they are too young to be vaccinated themselves, from hospitalization due to COVID-19.

## 2023-08-08 NOTE — PROGRESS NOTES
Patient presents with:  Prenatal Care: LUCY    Routine prenatal visit. Patient without complaints. Good fetal movement  Patient denies any bleeding, leaking fluid, cramping, or contractions. Assessment/Plan:  29w4d doing well  1 hour GTT, CBC done. HIV ordered. Blood type   Lab Results   Component Value Date    ABO A 2023    RH Positive 2023   Depression screen reviewed. Reviewed vaccine recommendations: Tdap next. Kick count information given. Reviewed  labor signs and symptoms. Diagnoses and all orders for this visit:     screening encounter  -     HIV AG AB COMBO; Future      Return in about 2 weeks (around 2023) for Routine Prenatal Visit and Tdap, HIV next available.

## 2023-09-06 ENCOUNTER — ROUTINE PRENATAL (OUTPATIENT)
Dept: OBGYN CLINIC | Facility: CLINIC | Age: 28
End: 2023-09-06
Payer: COMMERCIAL

## 2023-09-06 VITALS
SYSTOLIC BLOOD PRESSURE: 106 MMHG | WEIGHT: 180.63 LBS | DIASTOLIC BLOOD PRESSURE: 68 MMHG | BODY MASS INDEX: 30 KG/M2 | HEART RATE: 93 BPM

## 2023-09-06 DIAGNOSIS — Z34.80 SUPERVISION OF OTHER NORMAL PREGNANCY, ANTEPARTUM: Primary | ICD-10-CM

## 2023-09-06 PROCEDURE — 3074F SYST BP LT 130 MM HG: CPT | Performed by: OBSTETRICS & GYNECOLOGY

## 2023-09-06 PROCEDURE — 3078F DIAST BP <80 MM HG: CPT | Performed by: OBSTETRICS & GYNECOLOGY

## 2023-09-06 NOTE — PROGRESS NOTES
LUCY    GA: 33w5d   23  1458   BP: 106/68   Pulse: 93   Weight: 180 lb 9.6 oz (81.9 kg)       Doing well, +FM  Denies LOF/VB/uctx  Rh +, TDAP at next visit, EPDS 0  PTL and Fetal movement instructions given  Patient reminded to complete repeat HIV  Discussed with patient childcare issues/concerns during her labor. Patient has a less than 3year-old at home. Her  works as a . Patient's mother was unable to come from [facility]. Discussed with patient that child/minor would not be able to be present at the time of her labor/delivery. Encourage patient to seek out options for childcare. Also plan for induction of labor at 39 weeks. Problem List Items Addressed This Visit          Gravid and     Supervision of other normal pregnancy, antepartum - Primary       RTC in 2 wks      Note to patient and family   The [facility] makes medical notes available to patients in the interest of transparency. However, please be advised that this is a medical document. It is intended as czus-wp-ixdb communication. It is written and medical language may contain abbreviations or verbiage that are technical and unfamiliar. It may appear blunt or direct. Medical documents are intended to carry relevant information, facts as evident, and the clinical opinion of the practitioner.

## 2023-09-11 ENCOUNTER — LAB ENCOUNTER (OUTPATIENT)
Dept: LAB | Age: 28
End: 2023-09-11
Attending: OBSTETRICS & GYNECOLOGY
Payer: COMMERCIAL

## 2023-09-11 ENCOUNTER — TELEPHONE (OUTPATIENT)
Dept: OBGYN CLINIC | Facility: CLINIC | Age: 28
End: 2023-09-11

## 2023-09-11 DIAGNOSIS — Z36.9 ANTENATAL SCREENING ENCOUNTER: ICD-10-CM

## 2023-09-11 PROCEDURE — 87389 HIV-1 AG W/HIV-1&-2 AB AG IA: CPT | Performed by: OBSTETRICS & GYNECOLOGY

## 2023-09-11 NOTE — TELEPHONE ENCOUNTER
Hx:  GA:34w3d    Patient calling and states that she had a sharp pain on her left side. Thinks it was related to baby's position. Then developed mild cramping. States the cramping has resolved now. Discussed Maico Salmeron vs true labor. Patient advised to rest and stay hydrated; Tylenol if needed. Precautions provided. RIVS message sent with [place] precautions. No further questions.

## 2023-09-11 NOTE — TELEPHONE ENCOUNTER
Pt called saying she is 34wk pregnant and for the past 25mins has been feeling a sharp pain on her left side of her stomach and also cramping.

## 2023-09-19 ENCOUNTER — ROUTINE PRENATAL (OUTPATIENT)
Dept: OBGYN CLINIC | Facility: CLINIC | Age: 28
End: 2023-09-19
Payer: COMMERCIAL

## 2023-09-19 VITALS
HEIGHT: 65 IN | DIASTOLIC BLOOD PRESSURE: 72 MMHG | BODY MASS INDEX: 31.18 KG/M2 | SYSTOLIC BLOOD PRESSURE: 114 MMHG | WEIGHT: 187.13 LBS

## 2023-09-19 DIAGNOSIS — Z3A.35 35 WEEKS GESTATION OF PREGNANCY: Primary | ICD-10-CM

## 2023-09-19 PROCEDURE — 3074F SYST BP LT 130 MM HG: CPT | Performed by: STUDENT IN AN ORGANIZED HEALTH CARE EDUCATION/TRAINING PROGRAM

## 2023-09-19 PROCEDURE — 3078F DIAST BP <80 MM HG: CPT | Performed by: STUDENT IN AN ORGANIZED HEALTH CARE EDUCATION/TRAINING PROGRAM

## 2023-09-19 PROCEDURE — 3008F BODY MASS INDEX DOCD: CPT | Performed by: STUDENT IN AN ORGANIZED HEALTH CARE EDUCATION/TRAINING PROGRAM

## 2023-09-19 NOTE — PROGRESS NOTES
RETURN OB 35-41 WGA      GA: 35w4d   23  1517   BP: 114/72   Weight: 187 lb 2 oz (84.9 kg)   Height: 65\"       Doing well, +FM  Denies LOF/VB/uctx  Mode of delivery:   anticipated   Fetal movement count given  Labor precautions provided       Patient Active Problem List    Supervision of other normal pregnancy, antepartum            [x]  NIPT normal            GA by LMP: unknown             GA by US: 16w1d             FHT: 163 bpm             Impression: Single live IUP. Cardiac activity            noted. Cephalic. Anterior             placenta. Normal amniotic fluid. [ ] offered IOL at 39 weeks due to childcare            issues/concerns: schedule             after SVE at 36 wk      Mixed hyperlipidemia      History of vitamin D deficiency      Overweight (BMI 25.0-29. 9)            RTC 1 week              Note to patient and family   The [facility] makes medical notes available to patients in the interest of transparency. However, please be advised that this is a medical document. It is intended as psnm-nh-ygao communication. It is written and medical language may contain abbreviations or verbiage that are technical and unfamiliar. It may appear blunt or direct. Medical documents are intended to carry relevant information, facts as evident, and the clinical opinion of the practitioner.     Massiel Mc MD No

## 2023-09-27 ENCOUNTER — ROUTINE PRENATAL (OUTPATIENT)
Dept: OBGYN CLINIC | Facility: CLINIC | Age: 28
End: 2023-09-27
Payer: COMMERCIAL

## 2023-09-27 VITALS
DIASTOLIC BLOOD PRESSURE: 76 MMHG | WEIGHT: 187.19 LBS | SYSTOLIC BLOOD PRESSURE: 118 MMHG | HEART RATE: 94 BPM | BODY MASS INDEX: 31 KG/M2

## 2023-09-27 DIAGNOSIS — Z3A.36 36 WEEKS GESTATION OF PREGNANCY: Primary | ICD-10-CM

## 2023-09-27 DIAGNOSIS — B95.1 POSITIVE GBS TEST: ICD-10-CM

## 2023-09-27 DIAGNOSIS — Z36.85 ANTENATAL SCREENING FOR STREPTOCOCCUS B: ICD-10-CM

## 2023-09-27 PROCEDURE — 3078F DIAST BP <80 MM HG: CPT | Performed by: NURSE PRACTITIONER

## 2023-09-27 PROCEDURE — 87653 STREP B DNA AMP PROBE: CPT | Performed by: NURSE PRACTITIONER

## 2023-09-27 PROCEDURE — 87081 CULTURE SCREEN ONLY: CPT | Performed by: NURSE PRACTITIONER

## 2023-09-27 PROCEDURE — 3074F SYST BP LT 130 MM HG: CPT | Performed by: NURSE PRACTITIONER

## 2023-09-27 NOTE — PROGRESS NOTES
LUCY 36w5d    Doing well, +FM  Denies contractions  Denies LOF, VB      FHT-P  PNL:  GBS today  Mode of delivery: anticipate    Labor precautions reviewed  Fetal movement expectations discussed    Return in 1 weeks

## 2023-09-29 ENCOUNTER — TELEPHONE (OUTPATIENT)
Dept: OBGYN CLINIC | Facility: CLINIC | Age: 28
End: 2023-09-29

## 2023-09-29 PROBLEM — B95.1 POSITIVE GBS TEST: Status: ACTIVE | Noted: 2023-09-29

## 2023-09-29 LAB — GROUP B STREP BY PCR FOR PCR OVT: POSITIVE

## 2023-09-29 NOTE — TELEPHONE ENCOUNTER
Received breast pump orders from [employer]. [facility] signed and I faxed back.  Copy in plfd

## 2023-10-04 ENCOUNTER — ROUTINE PRENATAL (OUTPATIENT)
Dept: OBGYN CLINIC | Facility: CLINIC | Age: 28
End: 2023-10-04
Payer: COMMERCIAL

## 2023-10-04 VITALS
WEIGHT: 189.13 LBS | SYSTOLIC BLOOD PRESSURE: 122 MMHG | DIASTOLIC BLOOD PRESSURE: 72 MMHG | HEIGHT: 65 IN | HEART RATE: 99 BPM | BODY MASS INDEX: 31.51 KG/M2

## 2023-10-04 DIAGNOSIS — Z34.90 PRENATAL CARE, ANTEPARTUM: Primary | ICD-10-CM

## 2023-10-04 PROCEDURE — 3074F SYST BP LT 130 MM HG: CPT | Performed by: OBSTETRICS & GYNECOLOGY

## 2023-10-04 PROCEDURE — 3078F DIAST BP <80 MM HG: CPT | Performed by: OBSTETRICS & GYNECOLOGY

## 2023-10-04 PROCEDURE — 3008F BODY MASS INDEX DOCD: CPT | Performed by: OBSTETRICS & GYNECOLOGY

## 2023-10-04 NOTE — PROGRESS NOTES
Patient presents with:  Prenatal Care: LUCY    Routine prenatal visit without complaints. Patient denies any bleeding, leaking fluid, cramping, or regular uterine contractions. Good fetal movement. Assessment/Plan:  37w5d doing well  GBS pos  Desires 39+ week EIOL due to family scheduling issues. Request sent. Kick counts reviewed. Reviewed labor signs and symptoms. Diagnoses and all orders for this visit:    Prenatal care, antepartum       Return in about 1 week (around 10/11/2023) for Routine Prenatal Visit.

## 2023-10-05 ENCOUNTER — TELEPHONE (OUTPATIENT)
Dept: OBGYN CLINIC | Facility: CLINIC | Age: 28
End: 2023-10-05

## 2023-10-05 NOTE — TELEPHONE ENCOUNTER
----- Message from Michoacano Rivers MD sent at 10/4/2023  2:34 PM CDT -----  Desires elective IOL cytotec 39-40 weeks please.   Due date 10/20/23

## 2023-10-09 ENCOUNTER — TELEPHONE (OUTPATIENT)
Dept: OBGYN UNIT | Facility: HOSPITAL | Age: 28
End: 2023-10-09

## 2023-10-10 ENCOUNTER — ROUTINE PRENATAL (OUTPATIENT)
Dept: OBGYN CLINIC | Facility: CLINIC | Age: 28
End: 2023-10-10
Payer: COMMERCIAL

## 2023-10-10 VITALS
HEART RATE: 90 BPM | SYSTOLIC BLOOD PRESSURE: 118 MMHG | DIASTOLIC BLOOD PRESSURE: 84 MMHG | WEIGHT: 197.38 LBS | BODY MASS INDEX: 33 KG/M2

## 2023-10-10 DIAGNOSIS — Z34.80 SUPERVISION OF OTHER NORMAL PREGNANCY, ANTEPARTUM: Primary | ICD-10-CM

## 2023-10-10 DIAGNOSIS — B95.1 POSITIVE GBS TEST: ICD-10-CM

## 2023-10-10 DIAGNOSIS — Z23 NEED FOR VACCINATION: ICD-10-CM

## 2023-10-10 PROCEDURE — 90471 IMMUNIZATION ADMIN: CPT | Performed by: OBSTETRICS & GYNECOLOGY

## 2023-10-10 PROCEDURE — 3074F SYST BP LT 130 MM HG: CPT | Performed by: OBSTETRICS & GYNECOLOGY

## 2023-10-10 PROCEDURE — 3079F DIAST BP 80-89 MM HG: CPT | Performed by: OBSTETRICS & GYNECOLOGY

## 2023-10-10 PROCEDURE — 90686 IIV4 VACC NO PRSV 0.5 ML IM: CPT | Performed by: OBSTETRICS & GYNECOLOGY

## 2023-10-10 NOTE — PROGRESS NOTES
LUCY    GA: 38w4d   10/10/23  1405   BP: 118/84   Pulse: 90   Weight: 197 lb 6.4 oz (89.5 kg)       Doing well, +FM  Denies LOF/VB/uctx  Mode of delivery:   anticipated  SVE 50/-2    GBS positive  Fetal movement count given  Labor precautions provided   IOL scheduled 10/18, changed to AM only. Problem List Items Addressed This Visit          Gravid and     Supervision of other normal pregnancy, antepartum - Primary       Infectious Diseases    Positive GBS test     Other Visit Diagnoses       Need for vaccination        Relevant Orders    INFLUENZA VACCINE, QUAD, PRESERVATIVE FREE, 0.5 ML (Completed)              RTC 1 week            Note to patient and family   The [facility] makes medical notes available to patients in the interest of transparency. However, please be advised that this is a medical document. It is intended as isnb-ze-smzc communication. It is written and medical language may contain abbreviations or verbiage that are technical and unfamiliar. It may appear blunt or direct. Medical documents are intended to carry relevant information, facts as evident, and the clinical opinion of the practitioner.

## 2023-10-13 ENCOUNTER — HOSPITAL ENCOUNTER (INPATIENT)
Facility: HOSPITAL | Age: 28
LOS: 1 days | Discharge: HOME | End: 2023-10-14
Attending: OBSTETRICS & GYNECOLOGY | Admitting: OBSTETRICS & GYNECOLOGY
Payer: COMMERCIAL

## 2023-10-13 PROBLEM — Z34.90 PREGNANCY: Status: ACTIVE | Noted: 2023-10-13

## 2023-10-13 LAB
ALBUMIN SERPL-MCNC: 2.6 G/DL (ref 3.4–5)
ALBUMIN/GLOB SERPL: 0.7 {RATIO} (ref 1–2)
ALP LIVER SERPL-CCNC: 207 U/L
ALT SERPL-CCNC: 30 U/L
ANION GAP SERPL CALC-SCNC: 13 MMOL/L (ref 0–18)
ANTIBODY SCREEN: NEGATIVE
AST SERPL-CCNC: 34 U/L (ref 15–37)
BASOPHILS # BLD AUTO: 0.03 X10(3) UL (ref 0–0.2)
BASOPHILS NFR BLD AUTO: 0.2 %
BILIRUB SERPL-MCNC: 0.3 MG/DL (ref 0.1–2)
BUN BLD-MCNC: 9 MG/DL (ref 7–18)
CALCIUM BLD-MCNC: 8.7 MG/DL (ref 8.5–10.1)
CHLORIDE SERPL-SCNC: 109 MMOL/L (ref 98–112)
CO2 SERPL-SCNC: 17 MMOL/L (ref 21–32)
CREAT BLD-MCNC: 0.74 MG/DL
EGFRCR SERPLBLD CKD-EPI 2021: 114 ML/MIN/1.73M2 (ref 60–?)
EOSINOPHIL # BLD AUTO: 0.01 X10(3) UL (ref 0–0.7)
EOSINOPHIL NFR BLD AUTO: 0.1 %
ERYTHROCYTE [DISTWIDTH] IN BLOOD BY AUTOMATED COUNT: 12.1 %
GLOBULIN PLAS-MCNC: 3.9 G/DL (ref 2.8–4.4)
GLUCOSE BLD-MCNC: 86 MG/DL (ref 70–99)
HCT VFR BLD AUTO: 38.7 %
HGB BLD-MCNC: 13.1 G/DL
IMM GRANULOCYTES # BLD AUTO: 0.06 X10(3) UL (ref 0–1)
IMM GRANULOCYTES NFR BLD: 0.4 %
LYMPHOCYTES # BLD AUTO: 3.02 X10(3) UL (ref 1–4)
LYMPHOCYTES NFR BLD AUTO: 20.1 %
MCH RBC QN AUTO: 29.8 PG (ref 26–34)
MCHC RBC AUTO-ENTMCNC: 33.9 G/DL (ref 31–37)
MCV RBC AUTO: 88.2 FL
MONOCYTES # BLD AUTO: 0.81 X10(3) UL (ref 0.1–1)
MONOCYTES NFR BLD AUTO: 5.4 %
NEUTROPHILS # BLD AUTO: 11.08 X10 (3) UL (ref 1.5–7.7)
NEUTROPHILS # BLD AUTO: 11.08 X10(3) UL (ref 1.5–7.7)
NEUTROPHILS NFR BLD AUTO: 73.8 %
OSMOLALITY SERPL CALC.SUM OF ELEC: 286 MOSM/KG (ref 275–295)
PLATELET # BLD AUTO: 185 10(3)UL (ref 150–450)
POTASSIUM SERPL-SCNC: 3.7 MMOL/L (ref 3.5–5.1)
PROT SERPL-MCNC: 6.5 G/DL (ref 6.4–8.2)
RBC # BLD AUTO: 4.39 X10(6)UL
RH BLOOD TYPE: POSITIVE
SODIUM SERPL-SCNC: 139 MMOL/L (ref 136–145)
T PALLIDUM AB SER QL IA: NONREACTIVE
WBC # BLD AUTO: 15 X10(3) UL (ref 4–11)

## 2023-10-13 PROCEDURE — 0UQMXZZ REPAIR VULVA, EXTERNAL APPROACH: ICD-10-PCS | Performed by: OBSTETRICS & GYNECOLOGY

## 2023-10-13 PROCEDURE — 59400 OBSTETRICAL CARE: CPT | Performed by: OBSTETRICS & GYNECOLOGY

## 2023-10-13 RX ORDER — BISACODYL 10 MG
10 SUPPOSITORY, RECTAL RECTAL ONCE AS NEEDED
Status: DISCONTINUED | OUTPATIENT
Start: 2023-10-13 | End: 2023-10-14

## 2023-10-13 RX ORDER — ACETAMINOPHEN 500 MG
500 TABLET ORAL EVERY 6 HOURS PRN
Status: DISCONTINUED | OUTPATIENT
Start: 2023-10-13 | End: 2023-10-14

## 2023-10-13 RX ORDER — OXYTOCIN 10 [USP'U]/ML
INJECTION, SOLUTION INTRAMUSCULAR; INTRAVENOUS
Status: COMPLETED
Start: 2023-10-13 | End: 2023-10-13

## 2023-10-13 RX ORDER — AMPICILLIN 2 G/1
INJECTION, POWDER, FOR SOLUTION INTRAVENOUS
Status: DISPENSED
Start: 2023-10-13 | End: 2023-10-13

## 2023-10-13 RX ORDER — IBUPROFEN 600 MG/1
600 TABLET ORAL ONCE AS NEEDED
Status: COMPLETED | OUTPATIENT
Start: 2023-10-13 | End: 2023-10-13

## 2023-10-13 RX ORDER — SODIUM CHLORIDE, SODIUM LACTATE, POTASSIUM CHLORIDE, CALCIUM CHLORIDE 600; 310; 30; 20 MG/100ML; MG/100ML; MG/100ML; MG/100ML
INJECTION, SOLUTION INTRAVENOUS CONTINUOUS
Status: DISCONTINUED | OUTPATIENT
Start: 2023-10-13 | End: 2023-10-13 | Stop reason: HOSPADM

## 2023-10-13 RX ORDER — ACETAMINOPHEN 500 MG
1000 TABLET ORAL EVERY 6 HOURS PRN
Status: DISCONTINUED | OUTPATIENT
Start: 2023-10-13 | End: 2023-10-14

## 2023-10-13 RX ORDER — IBUPROFEN 600 MG/1
600 TABLET ORAL EVERY 6 HOURS
Status: DISCONTINUED | OUTPATIENT
Start: 2023-10-13 | End: 2023-10-14

## 2023-10-13 RX ORDER — ACETAMINOPHEN 500 MG
500 TABLET ORAL EVERY 6 HOURS PRN
Status: DISCONTINUED | OUTPATIENT
Start: 2023-10-13 | End: 2023-10-13

## 2023-10-13 RX ORDER — CITRIC ACID/SODIUM CITRATE 334-500MG
30 SOLUTION, ORAL ORAL AS NEEDED
Status: DISCONTINUED | OUTPATIENT
Start: 2023-10-13 | End: 2023-10-13 | Stop reason: HOSPADM

## 2023-10-13 RX ORDER — SIMETHICONE 80 MG
80 TABLET,CHEWABLE ORAL 3 TIMES DAILY PRN
Status: DISCONTINUED | OUTPATIENT
Start: 2023-10-13 | End: 2023-10-14

## 2023-10-13 RX ORDER — TERBUTALINE SULFATE 1 MG/ML
0.25 INJECTION, SOLUTION SUBCUTANEOUS AS NEEDED
Status: DISCONTINUED | OUTPATIENT
Start: 2023-10-13 | End: 2023-10-13 | Stop reason: HOSPADM

## 2023-10-13 RX ORDER — ACETAMINOPHEN 500 MG
1000 TABLET ORAL EVERY 6 HOURS PRN
Status: DISCONTINUED | OUTPATIENT
Start: 2023-10-13 | End: 2023-10-13

## 2023-10-13 RX ORDER — OXYTOCIN 10 [USP'U]/ML
10 INJECTION, SOLUTION INTRAMUSCULAR; INTRAVENOUS ONCE
Status: COMPLETED | OUTPATIENT
Start: 2023-10-13 | End: 2023-10-13

## 2023-10-13 RX ORDER — DOCUSATE SODIUM 100 MG/1
100 CAPSULE, LIQUID FILLED ORAL
Status: DISCONTINUED | OUTPATIENT
Start: 2023-10-13 | End: 2023-10-14

## 2023-10-13 RX ORDER — ONDANSETRON 2 MG/ML
4 INJECTION INTRAMUSCULAR; INTRAVENOUS EVERY 6 HOURS PRN
Status: DISCONTINUED | OUTPATIENT
Start: 2023-10-13 | End: 2023-10-13 | Stop reason: HOSPADM

## 2023-10-13 RX ORDER — DEXTROSE, SODIUM CHLORIDE, SODIUM LACTATE, POTASSIUM CHLORIDE, AND CALCIUM CHLORIDE 5; .6; .31; .03; .02 G/100ML; G/100ML; G/100ML; G/100ML; G/100ML
INJECTION, SOLUTION INTRAVENOUS AS NEEDED
Status: DISCONTINUED | OUTPATIENT
Start: 2023-10-13 | End: 2023-10-13 | Stop reason: HOSPADM

## 2023-10-13 NOTE — L&D DELIVERY NOTE
Anselmo Colby [[id number]]      Labor Events     labor?: No   steroids?: None  Antibiotics received during labor?: No  Rupture date/time: 10/13/2023 0230     Rupture type: SROM  Fluid color: Clear  Labor type: Spontaneous Onset of Labor  Augmentation: None  Intrapartum & labor complications: Group B beta strep +       Labor Length    1st stage: 4h 41m  2nd stage: 0h 03m  3rd stage: 0h 04m       Labor Event Times    Labor onset date/time: 10/13/2023 0230  Dilation complete date/time: 10/13/2023 2854  Start pushing date/time: 10/13/2023 0712       [place] Presentation    Presentation: Vertex  Position: Left Occiput Anterior       Operative Delivery    Operative Vaginal Delivery: No                      Shoulder Dystocia    Shoulder Dystocia: Yes  Addtional staff called for assistance: No  Maneuver performed: Fabiana, Suprapubic pressure on fetal shoulder           Anesthesia    Method: None              [place] Delivery      Delivery date/time:  10/13/23 07:14:55   Delivery type: Normal spontaneous vaginal delivery    Details:     Delivery location: delivery room       Delivery Providers    Delivering Clinician: Luis Seymour MD   Delivery personnel:  Provider Role   Izzy Montes RN Baby Nurse   Christy Ashraf RN Delivery Nurse             Cord    Vessels: 3 Vessels  Complications: None  Timed cord clamping: No  Time in sec: 30  Cord blood disposition: to lab  Gases sent?: No       Resuscitation    Method: None        Measurements      Weight: 3630 g 8 lb Length: 52 cm     Head circum.: 34.5 cm Chest circum.: 33 cm          Abdominal circum.: 32 cm           Placenta    Date/time: 10/13/2023 0719  Removal: Spontaneous  Appearance: Intact  Disposition: held for future pathology       Apgars    Living status: Living   Apgar Scoring Key:    0 1 2    Skin color Blue or pale Acrocyanotic Completely pink    Heart rate Absent <100 bpm >100 bpm    Reflex irritability No response Grimace Cry or active withdrawal    Muscle tone Limp Some flexion Active motion    Respiratory effort Absent Weak cry; hypoventilation Good, crying              1 Minute:  5 Minute:  10 Minute:  15 Minute:  20 Minute:      Skin color: 0  1       Heart rate: 2  2       Reflex irritablity: 2  2       Muscle tone: 2  2       Respiratory effort: 2  2       Total: 8  9          Apgars assigned by: [facility]   disposition: with mother       Skin to Skin    Skin to skin initiated date/time: 10/13/2023 0714  Skin to skin with: Mother       Vaginal Count    Initial count RN: Julia Marcos RN  Initial count Tech: Sheliah Poll   Sponges   Sharps    Initial counts 11   0    Final counts 11   2    Final count RN: Darius Vasquez RN  Final count MD: Marek Dumas MD       Delivery (Maternal)    Episiotomy: None  Perineal lacerations: None      Periurethral laceration: right Repaired?: Yes     Vaginal laceration?: No      Cervical laceration?: No    Clitoral laceration?: No    Quantitative blood loss (mL): 93              Vaginal Delivery Note          Melvia Hum Patient Status:  Inpatient    3/31/1995 MRN [id number]   Location [address] [place] [address] Attending Raphael Garcia MD   Hosp Day # 0 PCP Suzanne Galaviz MD     Date of Delivery: 10/13/23    Pre Op Dx:  IUP at Term    Post Op Dx: IUP at Term, [address] [place] [address], Shoulder Dystocia     Op: Spontaneous Vaginal Delivery complicated by Shoulder Dystocia     Surgeon: Isiah Fontana MD        Labor & Delivery  Date of Delivery: 10/13/2023  Delivery Type: Normal spontaneous vaginal delivery  Onset of Active Labor: 10/13/2023 2:30 AM    Onset of Second Stage: 7:11 AM  Time of Head Delivered: 7:13:30 AM  Time of Body Delivered: 7:14: 55 AM    Operative Vaginal Delivery:   No    Shoulder Dystocia:  Maneuvers attempted below with order. Duration: 1 min 25 sec.     Any/All maneuvers that apply and the order in which they were utilized. If the Maneuver was not used, it will not be checked and/or marked as N/A in the order selection. The order is not specified by the standard of care:     Maneuvers utilized In which Order   [x]Fabiana 1   [x]Suprapubic pressure 2   []Episiotomy N/A   []Episiotomy extension N/A   []Posterior arm release N/A   []Eagle's Maneuver (corkscrew from behind post shoulder) N/A   [x]Woods Maneuver (corkscrew from in front of posterior shoulder) 3   []Other (list)        N/A     Verify that fundal pressure was not applied after the head delivered:  [x] Not Applied  [] Applied    Infant:    Infant Sex/Birthweight: male 8 lb (3.63 kg)   Presentation Vertex [1]  Position Left [1] Occiput [1] Anterior [1]    Apgars:  1 minute: 8               5 minutes: 9                        10 minutes:      Neonatologist Present: no  Infant moving bilateral arms spontaneously: yes    Placenta  Date/Time of Delivery: 10/13/2023  7:19 AM   Delivery: Spontaneous  Placenta Disposition: held for future pathology  Cord Gases Submitted: No  Cord Blood Collection: yes  Cord Tissue Collection: yes  Cord Complications: None    Sponge and Needle Counts:  Verified yes    Maternal Anesthesia: None  Episiotomy/Laceration Repair: None    Intake/Output Data  Quantitative Blood Loss (mL) 93      Anesthesia: Local    EBL:  150 cc    Indications:  Patient is a 34year old  at 39w0d who presented in active labor and subsequently had a precipitous vaginal delivery. Findings:    Sex: male \"Eleno\"     [id number]      Apgars: 8/9      Lacerations: no perineal laceration, bilateral periurethral, no cervical   Shoulder dystocia   nuchal x 1      Procedure: The patients was placed in the dorsolithotomy position and prepped. She was encouraged to push. As the head was delivered in PENNY position, the legs were lowered and the perineum was supported to decrease the risk of tearing.   Due to precipitous delivery, epidural was unable to be placed. Therefore, the patient had significant pain during maternal pushing. Once the head was delivered, the patient found it difficult to push effectively. However, she was encouraged to push with strong maternal effort again. Nuchal cord x1 was identified and reduced. It was then noted that the shoulders did not rotate easily. Shoulder dystocia was identified and announced. The patient was instructed to stop pushing. Fabiana was performed. Suprapubic pressure was then applied. German maneuver performed. Delivery of the anterior arm (now left but was previously posterior prior to [facility] maneuver) was then performed. Followed by delivery of the posterior shoulder and remaining fetal body. Total time of shoulder dystocia was noted to be 1 min and 25 seconds. Bulb suction was then performed. The cord was doubly clamped then cut after 30 seconds of cord pulsation noted. The baby was placed on the mother's abdomen at her request. A segment of the umbilical cord was obtained for cord gas analysis. The cord blood was sampled. Placenta delivered spontaneosly by uterine massage. IV Pitocin was then initiated. The uterus was explored and evacuation of blood clots noted. Uterus noted to be firm. Good hemostasis noted. The perineum, vaginal mucosa and cervix was then examined. The bilateral periurethral lacerations were examined. The left periurethral laceration was superficial with good hemostasis. The right periurethral laceration had good hemostasis but increased depth. Therefore, the decision was made to reapproximate the right periurethral laceration. Local anesthesia was infiltrated along the right periurethral laceration. The right periurethral perineal laceration repaired with 2-0 interrupted rapide vicryl sutures. Bleeding was minimal.  The patient was then moved to the supine position in stable condition. Sponge and instrument counts were correct.     Complications:  Shoulder dystocia     D/w patient that delivery was complicated by shoulder dystocia including diagnosis, prognosis and management. Patient informed of risk of possible neurological deficit and will continue to monitor . Patient also informed of risk of shoulder dystocia in subsequent vaginal delivery. Mother and infant in good condition. The patient was informed of shoulder dystocia diagnosis including maneuvers performed and risk of adverse  outcomes. The  was doing well and moving both upper extremities spontaneously. The patient verbalized understanding and all questions/concerns were answered. All of the findings and plan were discussed with the patient. She notes understanding and agrees with the plan of care. All questions were answered to the best of my ability at this time. Mother and infant in good condition. Merlyn Bhakta MD   [facility] - OBGYN      Note to patient and family   The [facility] makes medical notes available to patients in the interest of transparency. However, please be advised that this is a medical document. It is intended as zppz-cr-gbws communication. It is written and medical language may contain abbreviations or verbiage that are technical and unfamiliar. It may appear blunt or direct. Medical documents are intended to carry relevant information, facts as evident, and the clinical opinion of the practitioner.

## 2023-10-13 NOTE — PROGRESS NOTES
Pt is a 34year old female admitted to 106/106-A. Patient presents with:  Laboring     Pt is [id number] 39w0d intra-uterine pregnancy. History obtained, consents signed. Oriented to room, staff, and plan of care.

## 2023-10-13 NOTE — PLAN OF CARE
Problem: Patient/Family Goals  Goal: Patient/Family Long Term Goal  Description: Patient's Long Term Goal: uncomplicated delivery     Interventions:  -   - See additional Care Plan goals for specific interventions  Outcome: Progressing  Goal: Patient/Family Short Term Goal  Description: Patient's Short Term Goal: pain control     Interventions:   -   - See additional Care Plan goals for specific interventions  Outcome: Progressing     Problem: BIRTH - VAGINAL/ SECTION  Goal: Fetal and maternal status remain reassuring during the birth process  Description: INTERVENTIONS:  - Monitor vital signs  - Monitor fetal heart rate  - Monitor uterine activity  - Monitor labor progression (vaginal delivery)  - DVT prophylaxis (C/S delivery)  - Surgical antibiotic prophylaxis (C/S delivery)  Outcome: Progressing     Problem: PAIN - ADULT  Goal: Verbalizes/displays adequate comfort level or patient's stated pain goal  Description: INTERVENTIONS:  - Encourage pt to monitor pain and request assistance  - Assess pain using appropriate pain scale  - Administer analgesics based on type and severity of pain and evaluate response  - Implement non-pharmacological measures as appropriate and evaluate response  - Consider cultural and social influences on pain and pain management  - Manage/alleviate anxiety  - Utilize distraction and/or relaxation techniques  - Monitor for opioid side effects  - Notify MD/LIP if interventions unsuccessful or patient reports new pain  - Anticipate increased pain with activity and pre-medicate as appropriate  Outcome: Progressing     Problem: ANXIETY  Goal: Will report anxiety at manageable levels  Description: INTERVENTIONS:  - Administer medication as ordered  - Teach and rehearse alternative coping skills  - Provide emotional support with 1:1 interaction with staff  Outcome: Progressing

## 2023-10-13 NOTE — PROGRESS NOTES
Patient transferred to postpartum room 2193 in stable condition. Bands verified with mom, and mother/baby RN. Report given to He Greer RN.

## 2023-10-13 NOTE — PLAN OF CARE
Problem: Patient/Family Goals  Goal: Patient/Family Long Term Goal  Description: Patient's Long Term Goal: uncomplicated delivery     Interventions:  -   - See additional Care Plan goals for specific interventions  10/13/2023 0755 by Cosmo Mixon RN  Outcome: Completed  10/13/2023 0755 by Cosmo Mixon RN  Outcome: Progressing  Goal: Patient/Family Short Term Goal  Description: Patient's Short Term Goal: pain control     Interventions:   -   - See additional Care Plan goals for specific interventions  10/13/2023 0755 by Cosmo Mixon RN  Outcome: Completed  10/13/2023 0755 by Cosmo Mixon RN  Outcome: Progressing     Problem: BIRTH - VAGINAL/ SECTION  Goal: Fetal and maternal status remain reassuring during the birth process  Description: INTERVENTIONS:  - Monitor vital signs  - Monitor fetal heart rate  - Monitor uterine activity  - Monitor labor progression (vaginal delivery)  - DVT prophylaxis (C/S delivery)  - Surgical antibiotic prophylaxis (C/S delivery)  10/13/2023 0755 by Cosmo Mixon RN  Outcome: Completed  10/13/2023 0755 by Cosmo Mixon RN  Outcome: Progressing     Problem: PAIN - ADULT  Goal: Verbalizes/displays adequate comfort level or patient's stated pain goal  Description: INTERVENTIONS:  - Encourage pt to monitor pain and request assistance  - Assess pain using appropriate pain scale  - Administer analgesics based on type and severity of pain and evaluate response  - Implement non-pharmacological measures as appropriate and evaluate response  - Consider cultural and social influences on pain and pain management  - Manage/alleviate anxiety  - Utilize distraction and/or relaxation techniques  - Monitor for opioid side effects  - Notify MD/LIP if interventions unsuccessful or patient reports new pain  - Anticipate increased pain with activity and pre-medicate as appropriate  10/13/2023 0755 by Cosmo Mixon RN  Outcome: Completed  10/13/2023 0755 by Cosmo Mixon RN  Outcome: Progressing     Problem: ANXIETY  Goal: Will report anxiety at manageable levels  Description: INTERVENTIONS:  - Administer medication as ordered  - Teach and rehearse alternative coping skills  - Provide emotional support with 1:1 interaction with staff  10/13/2023 0755 by Yair Patino RN  Outcome: Completed  10/13/2023 0755 by Yair Patino RN  Outcome: Progressing

## 2023-10-14 VITALS
BODY MASS INDEX: 26.16 KG/M2 | DIASTOLIC BLOOD PRESSURE: 86 MMHG | RESPIRATION RATE: 16 BRPM | HEART RATE: 54 BPM | SYSTOLIC BLOOD PRESSURE: 117 MMHG | HEIGHT: 65 IN | WEIGHT: 157 LBS | TEMPERATURE: 98 F

## 2023-10-14 LAB
BASOPHILS # BLD AUTO: 0.04 X10(3) UL (ref 0–0.2)
BASOPHILS NFR BLD AUTO: 0.4 %
EOSINOPHIL # BLD AUTO: 0.03 X10(3) UL (ref 0–0.7)
EOSINOPHIL NFR BLD AUTO: 0.3 %
ERYTHROCYTE [DISTWIDTH] IN BLOOD BY AUTOMATED COUNT: 12.3 %
HCT VFR BLD AUTO: 34.8 %
HGB BLD-MCNC: 11.6 G/DL
IMM GRANULOCYTES # BLD AUTO: 0.03 X10(3) UL (ref 0–1)
IMM GRANULOCYTES NFR BLD: 0.3 %
LYMPHOCYTES # BLD AUTO: 2.37 X10(3) UL (ref 1–4)
LYMPHOCYTES NFR BLD AUTO: 26.5 %
MCH RBC QN AUTO: 29.7 PG (ref 26–34)
MCHC RBC AUTO-ENTMCNC: 33.3 G/DL (ref 31–37)
MCV RBC AUTO: 89 FL
MONOCYTES # BLD AUTO: 0.47 X10(3) UL (ref 0.1–1)
MONOCYTES NFR BLD AUTO: 5.3 %
NEUTROPHILS # BLD AUTO: 5.99 X10 (3) UL (ref 1.5–7.7)
NEUTROPHILS # BLD AUTO: 5.99 X10(3) UL (ref 1.5–7.7)
NEUTROPHILS NFR BLD AUTO: 67.2 %
PLATELET # BLD AUTO: 179 10(3)UL (ref 150–450)
RBC # BLD AUTO: 3.91 X10(6)UL
WBC # BLD AUTO: 8.9 X10(3) UL (ref 4–11)

## 2023-10-14 NOTE — PLAN OF CARE
Problem: POSTPARTUM  Goal: Long Term Goal:Experiences normal postpartum course  Description: INTERVENTIONS:  - Assess and monitor vital signs and lab values. - Assess fundus and lochia. - Provide ice/sitz baths for perineum discomfort. - Monitor healing of incision/episiotomy/laceration, and assess for signs and symptoms of infection and hematoma. - Assess bladder function and monitor for bladder distention.  - Provide/instruct/assist with pericare as needed. - Provide VTE prophylaxis as needed. - Monitor bowel function.  - Encourage ambulation and provide assistance as needed. - Assess and monitor emotional status and provide social service/psych resources as needed. - Utilize standard precautions and use personal protective equipment as indicated. Ensure aseptic care of all intravenous lines and invasive tubes/drains.  - Obtain immunization and exposure to communicable diseases history. 10/13/2023 2007 by Aisha Dior RN  Outcome: Progressing  10/13/2023 2007 by Aisha Dior RN  Outcome: Progressing  Goal: Optimize infant feeding at the breast  Description: INTERVENTIONS:  - Initiate breast feeding within first hour after birth. - Monitor effectiveness of current breast feeding efforts. - Assess support systems available to mother/family.  - Identify cultural beliefs/practices regarding lactation, letdown techniques, maternal food preferences. - Assess mother's knowledge and previous experience with breast feeding.  - Provide information as needed about early infant feeding cues (e.g., rooting, lip smacking, sucking fingers/hand) versus late cue of crying.  - Discuss/demonstrate breast feeding aids (e.g., infant sling, nursing footstool/pillows, and breast pumps). - Encourage mother/other family members to express feelings/concerns, and actively listen. - Educate father/SO about benefits of breast feeding and how to manage common lactation challenges.   - Recommend avoidance of specific medications or substances incompatible with breast feeding.  - Assess and monitor for signs of nipple pain/trauma. - Instruct and provide assistance with proper latch. - Review techniques for milk expression (breast pumping) and storage of breast milk. Provide pumping equipment/supplies, instructions and assistance, as needed. - Encourage rooming-in and breast feeding on demand.  - Encourage skin-to-skin contact. - Provide LC support as needed. - Assess for and manage engorgement. - Provide breast feeding education handouts and information on community breast feeding support. 10/13/2023 2007 by Johan Lares RN  Outcome: Progressing  10/13/2023 2007 by Johan Lares RN  Outcome: Progressing  Goal: Appropriate maternal -  bonding  Description: INTERVENTIONS:  - Assess caregiver- interactions. - Assess caregiver's emotional status and coping mechanisms. - Encourage caregiver to participate in  daily care. - Assess support systems available to mother/family.  - Provide /case management support as needed.   10/13/2023 2007 by Johan Lares RN  Outcome: Progressing  10/13/2023 2007 by Johan Lares RN  Outcome: Progressing

## 2023-10-16 ENCOUNTER — PATIENT OUTREACH (OUTPATIENT)
Dept: CASE MANAGEMENT | Age: 28
End: 2023-10-16

## 2023-10-16 NOTE — PROGRESS NOTES
OBGYN Post Partum apt request (delivered 10/13)    Dr Emma Anderson  [address]  [phone]  Follow up 6 weeks  Spoke w/pt , [place]; asked for a call back in 30 mins

## 2023-10-16 NOTE — PROGRESS NOTES
OBGYN Post Partum apt request (delivered 10/13)     Dr Rinku Shipman  [facility]  [address]  [phone]  Follow up 6 weeks  Called pt back, unable to LVM (vm not set up)

## 2023-10-17 ENCOUNTER — TELEPHONE (OUTPATIENT)
Dept: OBGYN UNIT | Facility: HOSPITAL | Age: 28
End: 2023-10-17

## 2023-10-17 NOTE — PROGRESS NOTES
Cradle call completed. Mom and baby care reviewed. Baby was seen by pediatrician yesterday. Cradle call letters sent via [employer].

## 2023-10-19 NOTE — PROGRESS NOTES
[facility] Post Partum apt request (delivered 10/13)     Dr David Johnson  [address]  [phone]  Follow up 6 weeks  Multiple attempts, unable to contact pt (vm not set up); no apt made  Closing encounter

## 2023-11-22 ENCOUNTER — POSTPARTUM (OUTPATIENT)
Dept: OBGYN CLINIC | Facility: CLINIC | Age: 28
End: 2023-11-22
Payer: COMMERCIAL

## 2023-11-22 VITALS
DIASTOLIC BLOOD PRESSURE: 70 MMHG | BODY MASS INDEX: 28.03 KG/M2 | WEIGHT: 164.19 LBS | SYSTOLIC BLOOD PRESSURE: 106 MMHG | HEIGHT: 64 IN

## 2023-11-22 PROCEDURE — 3008F BODY MASS INDEX DOCD: CPT | Performed by: NURSE PRACTITIONER

## 2023-11-22 PROCEDURE — 3078F DIAST BP <80 MM HG: CPT | Performed by: NURSE PRACTITIONER

## 2023-11-22 PROCEDURE — 3074F SYST BP LT 130 MM HG: CPT | Performed by: NURSE PRACTITIONER

## 2023-12-03 ENCOUNTER — HOSPITAL ENCOUNTER (OUTPATIENT)
Age: 28
Discharge: HOME | End: 2023-12-03
Payer: COMMERCIAL

## 2023-12-03 VITALS
HEIGHT: 64 IN | BODY MASS INDEX: 27.66 KG/M2 | RESPIRATION RATE: 16 BRPM | DIASTOLIC BLOOD PRESSURE: 78 MMHG | HEART RATE: 70 BPM | TEMPERATURE: 97 F | SYSTOLIC BLOOD PRESSURE: 117 MMHG | WEIGHT: 162 LBS | OXYGEN SATURATION: 97 %

## 2023-12-03 DIAGNOSIS — J02.9 ACUTE VIRAL PHARYNGITIS: Primary | ICD-10-CM

## 2023-12-03 LAB — S PYO AG THROAT QL: NEGATIVE

## 2023-12-14 ENCOUNTER — OFFICE VISIT (OUTPATIENT)
Dept: OBGYN CLINIC | Facility: CLINIC | Age: 28
End: 2023-12-14
Payer: COMMERCIAL

## 2023-12-14 VITALS
HEIGHT: 65 IN | WEIGHT: 163.81 LBS | DIASTOLIC BLOOD PRESSURE: 70 MMHG | SYSTOLIC BLOOD PRESSURE: 102 MMHG | BODY MASS INDEX: 27.29 KG/M2

## 2023-12-14 DIAGNOSIS — Z30.430 ENCOUNTER FOR INSERTION OF INTRAUTERINE CONTRACEPTIVE DEVICE (IUD): Primary | ICD-10-CM

## 2023-12-14 DIAGNOSIS — Z01.818 PREPROCEDURAL EXAMINATION: ICD-10-CM

## 2023-12-14 LAB
CONTROL LINE PRESENT WITH A CLEAR BACKGROUND (YES/NO): YES YES/NO
KIT LOT #: 7042 NUMERIC
PREGNANCY TEST, URINE: NEGATIVE

## 2023-12-14 PROCEDURE — 3078F DIAST BP <80 MM HG: CPT | Performed by: NURSE PRACTITIONER

## 2023-12-14 PROCEDURE — 81025 URINE PREGNANCY TEST: CPT | Performed by: NURSE PRACTITIONER

## 2023-12-14 PROCEDURE — 3074F SYST BP LT 130 MM HG: CPT | Performed by: NURSE PRACTITIONER

## 2023-12-14 PROCEDURE — 3008F BODY MASS INDEX DOCD: CPT | Performed by: NURSE PRACTITIONER

## 2023-12-14 PROCEDURE — 58300 INSERT INTRAUTERINE DEVICE: CPT | Performed by: NURSE PRACTITIONER

## 2023-12-14 NOTE — PATIENT INSTRUCTIONS
[facility] [place]  After Care Instructions for IUD      Bleeding   You may experience irregular bleeding for the fist 3-6 months. If your bleeding becomes heavier than a normal menstrual cycle, please contact our office. Pain  You may experience mild menstrual cramping after the IUD insertion that will typically last 24-48hrs. You may take Ibuprofen, Tylenol or Aleve to relieve the discomfort. If you experience severe or persistent pain, please contact our office. Restrictions    You should avoid sexual intercourse or tampon use for 1 day after insertion. Please use a backup method of contraception for 4-7 days with the [place]. When to contact our office  If you are experiencing discomfort described as worse than menstrual cramps that is not relieved by ibuprofen   Fever of 100.4 or greater  Vaginal bleeding that is saturating 1 pad per hour    Any discomfort or poking sensation during intercourse or other sexual activity      Follow up in 4-6 weeks for IUD check. If you have additional questions or concerns, please call us at [phone].

## 2023-12-14 NOTE — PROCEDURES
Procedure:  IUD insertion, Mirena    Indications:   Patient is a 34year old  who presents for Mirena IUD insertion. Risks were discussed, including bleeding, infection, and uterine perforation. Patient's last menstrual period was 2023. A urine HCG was performed and was negative. Consent form signed. Procedure:  Patient was taken to the procedure room. A sterile speculum was placed and the cervix was visualized and cleansed with betadine. A single toothed tenaculum was placed. The uterus was sounded to 7.5 cm. The Mirena IUD was then placed without difficulty under 's instructions. The strings were trimmed to 2-3 cm. The tenaculum was removed. Sites hemostatic with silver nitrate. The speculum was removed. Postprocedure instructions were reviewed, including pelvic rest. She was advised to return in 4 weeks for an IUD string check.

## 2023-12-14 NOTE — PROGRESS NOTES
Subjective:  34year old    Chief Complaint   Patient presents with    Procedure     Majel Closs insert      Pt delivered 10/2023  Is here today requesting placement of Mirena IUD    Review of Systems:  Pertinent items are noted in the HPI. Objective:  /70   Ht 65\"   Wt 163 lb 12.8 oz (74.3 kg)   LMP 2023     Physical Examination:  General appearance: Well dressed, well nourished in no apparent distress  Neurologic/Psychiatric: Alert and oriented to person, place and time, mood normal, affect appropriate  Abdomen: Soft, non-tender, non-distended, no masses, no hepatosplenomegaly, no hernias, no inguinal lymphadenopathy  Pelvic:    External genitalia- Normal, Bartholin's, urethra, skeins glands normal   Vagina- No vaginal lesions, physiologic discharge   Cervix- No lesions, long/closed, no cervical motion tenderness   Uterus- Normal, non-tender, no masses   Adnexa-  Non-tender, no masses    Assessment/Plan:      Diagnoses and all orders for this visit:    Encounter for insertion of intrauterine contraceptive device (IUD)  -     Levonorgestrel (Mirena) IUD 1 each  -     Insert IUD [31801] - see procedure note    Preprocedural examination  -     Urine Preg Test [51359]- negative        Return in about 4 weeks (around 2024) for IUD check.

## 2024-01-16 ENCOUNTER — OFFICE VISIT (OUTPATIENT)
Dept: OBGYN CLINIC | Facility: CLINIC | Age: 29
End: 2024-01-16
Payer: COMMERCIAL

## 2024-01-16 VITALS
SYSTOLIC BLOOD PRESSURE: 112 MMHG | DIASTOLIC BLOOD PRESSURE: 72 MMHG | HEIGHT: 65 IN | WEIGHT: 158.5 LBS | BODY MASS INDEX: 26.41 KG/M2 | HEART RATE: 82 BPM

## 2024-01-16 DIAGNOSIS — Z30.431 IUD CHECK UP: Primary | ICD-10-CM

## 2024-01-16 PROCEDURE — 3078F DIAST BP <80 MM HG: CPT | Performed by: NURSE PRACTITIONER

## 2024-01-16 PROCEDURE — 3008F BODY MASS INDEX DOCD: CPT | Performed by: NURSE PRACTITIONER

## 2024-01-16 PROCEDURE — 99212 OFFICE O/P EST SF 10 MIN: CPT | Performed by: NURSE PRACTITIONER

## 2024-01-16 PROCEDURE — 3074F SYST BP LT 130 MM HG: CPT | Performed by: NURSE PRACTITIONER

## 2024-01-16 NOTE — PROGRESS NOTES
Subjective:  Corin Peña presents for follow up to Mirena IUD insertion.    She denies any pain, fever or vaginal discharge.  She has had minimal bleeding since insertion.    Pt has not felt her own strings  Pt has not had intercourse    Objective:  Physical Exam:   /72   Pulse 82   Ht 65\"   Wt 158 lb 8 oz (71.9 kg)   LMP  (LMP Unknown)   Breastfeeding Yes   BMI 26.38 kg/m²    General: Comfortable, no apparent distress  Abdomen: Soft, non-tender, non-distended, no masses  Pelvic: External genitalia normal, no cervical or vaginal lesions, no discharge  Cervix: IUD string visible 3 cm  Bimanual examination:  Uterus non-tender, no cervical motion tenderness, adnexa non-tender, no masses    Assessment/Plan:  Normal IUD follow up examination  Follow up as needed or for routine annual gynecologic examination    Diagnoses and all orders for this visit:    IUD check up  - IUD strings visualized  - IUD placed 12/2023  - Remove on or before 12/2031      Return for annual well woman exam or sooner if needed.

## (undated) NOTE — LETTER
23    Re: Caitlyn Gan  : 3/31/1995    To Whom It May Concern:  Caitlyn Gan is under my care for pregnancy with an Estimated Date of Delivery: 10/20/23. We have discussed ACOG recommendations for the Covid-19 vaccination during pregnancy. At this time, Casandra Blancas is requesting to defer administration until after she delivers. If you have any questions concerning this letter, please feel free to contact my office.       Sincerely yours,    Kameron Saul MD